# Patient Record
Sex: FEMALE | Race: BLACK OR AFRICAN AMERICAN | Employment: OTHER | URBAN - METROPOLITAN AREA
[De-identification: names, ages, dates, MRNs, and addresses within clinical notes are randomized per-mention and may not be internally consistent; named-entity substitution may affect disease eponyms.]

---

## 2018-06-28 PROBLEM — I10 ESSENTIAL HYPERTENSION: Status: ACTIVE | Noted: 2018-06-28

## 2018-06-28 PROBLEM — E66.9 OBESITY (BMI 30.0-34.9): Status: ACTIVE | Noted: 2018-06-28

## 2018-06-28 PROBLEM — G62.9 NEUROPATHY: Status: ACTIVE | Noted: 2018-06-28

## 2018-06-28 PROBLEM — M25.561 CHRONIC PAIN OF RIGHT KNEE: Status: ACTIVE | Noted: 2018-06-28

## 2018-06-28 PROBLEM — G89.29 CHRONIC PAIN OF RIGHT KNEE: Status: ACTIVE | Noted: 2018-06-28

## 2018-06-28 PROBLEM — E78.2 MIXED HYPERLIPIDEMIA: Status: ACTIVE | Noted: 2018-06-28

## 2018-06-28 PROBLEM — Z72.0 TOBACCO ABUSE: Status: ACTIVE | Noted: 2018-06-28

## 2018-06-28 PROBLEM — F33.0 MILD EPISODE OF RECURRENT MAJOR DEPRESSIVE DISORDER (HCC): Status: ACTIVE | Noted: 2018-06-28

## 2018-09-25 ENCOUNTER — HOSPITAL ENCOUNTER (OUTPATIENT)
Dept: LAB | Age: 68
Discharge: HOME | End: 2018-09-25

## 2018-09-25 PROCEDURE — 88305 TISSUE EXAM BY PATHOLOGIST: CPT

## 2019-02-28 PROBLEM — E66.01 SEVERE OBESITY (HCC): Status: ACTIVE | Noted: 2019-02-28

## 2019-03-06 ENCOUNTER — APPOINTMENT (OUTPATIENT)
Dept: PHYSICAL THERAPY | Age: 69
End: 2019-03-06

## 2019-03-27 ENCOUNTER — HOSPITAL ENCOUNTER (OUTPATIENT)
Dept: MAMMOGRAPHY | Age: 69
Discharge: HOME | End: 2019-03-27
Attending: NURSE PRACTITIONER
Payer: MEDICARE

## 2019-03-27 DIAGNOSIS — Z12.39 SCREENING BREAST EXAMINATION: ICD-10-CM

## 2019-03-27 DIAGNOSIS — Z78.0 POSTMENOPAUSAL: ICD-10-CM

## 2019-03-27 PROCEDURE — 77080 DXA BONE DENSITY AXIAL: CPT

## 2019-03-27 PROCEDURE — 77067 SCR MAMMO BI INCL CAD: CPT

## 2019-03-27 NOTE — PROGRESS NOTES
Is pt taking her calcium and Vitamin D? If so, continue this.  BMD reveals 10 year probability of major osteoporotic fracture:  3.8%  10 year probability of hip fracture:  0.7%

## 2019-04-02 NOTE — PROGRESS NOTES
To pt.  Mammogram ok. NO SPECIFIC MAMMOGRAPHIC EVIDENCE FOR MALIGNANCY.  RECOMMEND FOLLOW-UP  MAMMOGRAPHY IN ONE YEAR.

## 2019-09-10 ENCOUNTER — APPOINTMENT (OUTPATIENT)
Dept: PHYSICAL THERAPY | Age: 69
End: 2019-09-10

## 2019-09-17 ENCOUNTER — HOSPITAL ENCOUNTER (OUTPATIENT)
Dept: SURGERY | Age: 69
Discharge: HOME | End: 2019-09-17
Payer: MEDICARE

## 2019-09-17 ENCOUNTER — HOME HEALTH ADMISSION (OUTPATIENT)
Dept: HOME HEALTH SERVICES | Facility: HOME HEALTH | Age: 69
End: 2019-09-17
Payer: MEDICARE

## 2019-09-17 ENCOUNTER — HOSPITAL ENCOUNTER (OUTPATIENT)
Dept: PHYSICAL THERAPY | Age: 69
Discharge: HOME | End: 2019-09-17
Payer: MEDICARE

## 2019-09-17 VITALS
DIASTOLIC BLOOD PRESSURE: 77 MMHG | HEIGHT: 65 IN | SYSTOLIC BLOOD PRESSURE: 124 MMHG | HEART RATE: 76 BPM | OXYGEN SATURATION: 98 % | TEMPERATURE: 97.2 F | WEIGHT: 205 LBS | BODY MASS INDEX: 34.16 KG/M2

## 2019-09-17 DIAGNOSIS — R06.83 SNORING: Primary | ICD-10-CM

## 2019-09-17 LAB
ALBUMIN SERPL-MCNC: 4.1 G/DL (ref 3.2–4.6)
ANION GAP SERPL CALC-SCNC: 12 MMOL/L (ref 7–16)
APPEARANCE UR: ABNORMAL
APTT PPP: 24.5 SEC (ref 24.7–39.8)
BACTERIA SPEC CULT: NORMAL
BACTERIA URNS QL MICRO: 0 /HPF
BASOPHILS # BLD: 0.1 K/UL (ref 0–0.2)
BASOPHILS NFR BLD: 1 % (ref 0–2)
BILIRUB UR QL: NEGATIVE
BUN SERPL-MCNC: 15 MG/DL (ref 8–23)
CALCIUM SERPL-MCNC: 9.4 MG/DL (ref 8.3–10.4)
CASTS URNS QL MICRO: ABNORMAL /LPF
CHLORIDE SERPL-SCNC: 103 MMOL/L (ref 98–107)
CO2 SERPL-SCNC: 22 MMOL/L (ref 21–32)
COLOR UR: YELLOW
CREAT SERPL-MCNC: 0.99 MG/DL (ref 0.6–1)
DIFFERENTIAL METHOD BLD: ABNORMAL
EOSINOPHIL # BLD: 0.2 K/UL (ref 0–0.8)
EOSINOPHIL NFR BLD: 2 % (ref 0.5–7.8)
EPI CELLS #/AREA URNS HPF: ABNORMAL /HPF
ERYTHROCYTE [DISTWIDTH] IN BLOOD BY AUTOMATED COUNT: 11.9 % (ref 11.9–14.6)
EST. AVERAGE GLUCOSE BLD GHB EST-MCNC: 131 MG/DL
GLUCOSE SERPL-MCNC: 227 MG/DL (ref 65–100)
GLUCOSE UR STRIP.AUTO-MCNC: NEGATIVE MG/DL
HBA1C MFR BLD: 6.2 %
HCT VFR BLD AUTO: 35.7 % (ref 35.8–46.3)
HGB BLD-MCNC: 11.7 G/DL (ref 11.7–15.4)
HGB UR QL STRIP: NEGATIVE
IMM GRANULOCYTES # BLD AUTO: 0 K/UL (ref 0–0.5)
IMM GRANULOCYTES NFR BLD AUTO: 1 % (ref 0–5)
INR PPP: 0.9
KETONES UR QL STRIP.AUTO: NEGATIVE MG/DL
LEUKOCYTE ESTERASE UR QL STRIP.AUTO: ABNORMAL
LYMPHOCYTES # BLD: 2.2 K/UL (ref 0.5–4.6)
LYMPHOCYTES NFR BLD: 27 % (ref 13–44)
MCH RBC QN AUTO: 32.6 PG (ref 26.1–32.9)
MCHC RBC AUTO-ENTMCNC: 32.8 G/DL (ref 31.4–35)
MCV RBC AUTO: 99.4 FL (ref 79.6–97.8)
MONOCYTES # BLD: 0.4 K/UL (ref 0.1–1.3)
MONOCYTES NFR BLD: 5 % (ref 4–12)
NEUTS SEG # BLD: 5.1 K/UL (ref 1.7–8.2)
NEUTS SEG NFR BLD: 64 % (ref 43–78)
NITRITE UR QL STRIP.AUTO: NEGATIVE
NRBC # BLD: 0 K/UL (ref 0–0.2)
PH UR STRIP: 5.5 [PH] (ref 5–9)
PLATELET # BLD AUTO: 317 K/UL (ref 150–450)
PMV BLD AUTO: 11.7 FL (ref 9.4–12.3)
POTASSIUM SERPL-SCNC: 3.6 MMOL/L (ref 3.5–5.1)
PROT UR STRIP-MCNC: ABNORMAL MG/DL
PROTHROMBIN TIME: 12.5 SEC (ref 11.7–14.5)
RBC # BLD AUTO: 3.59 M/UL (ref 4.05–5.2)
RBC #/AREA URNS HPF: ABNORMAL /HPF
SERVICE CMNT-IMP: NORMAL
SODIUM SERPL-SCNC: 137 MMOL/L (ref 136–145)
SP GR UR REFRACTOMETRY: 1.02 (ref 1–1.02)
UROBILINOGEN UR QL STRIP.AUTO: 1 EU/DL (ref 0.2–1)
WBC # BLD AUTO: 8 K/UL (ref 4.3–11.1)
WBC URNS QL MICRO: ABNORMAL /HPF

## 2019-09-17 PROCEDURE — 77030027138 HC INCENT SPIROMETER -A

## 2019-09-17 PROCEDURE — 97161 PT EVAL LOW COMPLEX 20 MIN: CPT

## 2019-09-17 PROCEDURE — 83036 HEMOGLOBIN GLYCOSYLATED A1C: CPT

## 2019-09-17 PROCEDURE — 85730 THROMBOPLASTIN TIME PARTIAL: CPT

## 2019-09-17 PROCEDURE — 85610 PROTHROMBIN TIME: CPT

## 2019-09-17 PROCEDURE — 82040 ASSAY OF SERUM ALBUMIN: CPT

## 2019-09-17 PROCEDURE — 93005 ELECTROCARDIOGRAM TRACING: CPT | Performed by: NURSE PRACTITIONER

## 2019-09-17 PROCEDURE — 81001 URINALYSIS AUTO W/SCOPE: CPT

## 2019-09-17 PROCEDURE — 85025 COMPLETE CBC W/AUTO DIFF WBC: CPT

## 2019-09-17 PROCEDURE — 36415 COLL VENOUS BLD VENIPUNCTURE: CPT

## 2019-09-17 PROCEDURE — 87641 MR-STAPH DNA AMP PROBE: CPT

## 2019-09-17 PROCEDURE — 80048 BASIC METABOLIC PNL TOTAL CA: CPT

## 2019-09-17 PROCEDURE — 80307 DRUG TEST PRSMV CHEM ANLYZR: CPT

## 2019-09-17 RX ORDER — MELATONIN
1000 DAILY
COMMUNITY
End: 2020-08-25 | Stop reason: SDUPTHER

## 2019-09-17 RX ORDER — LANOLIN ALCOHOL/MO/W.PET/CERES
1000 CREAM (GRAM) TOPICAL DAILY
COMMUNITY
End: 2020-08-25 | Stop reason: SDUPTHER

## 2019-09-17 NOTE — PERIOP NOTES
Reed Cox MD    Your patient recently had labs drawn during a hospital appointment due to an upcoming surgery. The results are attached. If you have any questions or concerns please reach out to your patient for a follow-up in your office. Please do not respond to this message as my mailbox is not monitored. You may call [phone] with questions or concerns. Patient wanted you to be aware that her BP today was 124/77. Thanks! Recent Results (from the past 12 hour(s))   CBC WITH AUTOMATED DIFF    Collection Time: 09/17/19 11:55 AM   Result Value Ref Range    WBC 8.0 4.3 - 11.1 K/uL    RBC 3.59 (L) 4.05 - 5.2 M/uL    HGB 11.7 11.7 - 15.4 g/dL    HCT 35.7 (L) 35.8 - 46.3 %    MCV 99.4 (H) 79.6 - 97.8 FL    MCH 32.6 26.1 - 32.9 PG    MCHC 32.8 31.4 - 35.0 g/dL    RDW 11.9 11.9 - 14.6 %    PLATELET 815 959 - 349 K/uL    MPV 11.7 9.4 - 12.3 FL    ABSOLUTE NRBC 0.00 0.0 - 0.2 K/uL    DF AUTOMATED      NEUTROPHILS 64 43 - 78 %    LYMPHOCYTES 27 13 - 44 %    MONOCYTES 5 4.0 - 12.0 %    EOSINOPHILS 2 0.5 - 7.8 %    BASOPHILS 1 0.0 - 2.0 %    IMMATURE GRANULOCYTES 1 0.0 - 5.0 %    ABS. NEUTROPHILS 5.1 1.7 - 8.2 K/UL    ABS. LYMPHOCYTES 2.2 0.5 - 4.6 K/UL    ABS. MONOCYTES 0.4 0.1 - 1.3 K/UL    ABS. EOSINOPHILS 0.2 0.0 - 0.8 K/UL    ABS. BASOPHILS 0.1 0.0 - 0.2 K/UL    ABS. IMM.  GRANS. 0.0 0.0 - 0.5 K/UL   METABOLIC PANEL, BASIC    Collection Time: 09/17/19 11:55 AM   Result Value Ref Range    Sodium 137 136 - 145 mmol/L    Potassium 3.6 3.5 - 5.1 mmol/L    Chloride 103 98 - 107 mmol/L    CO2 22 21 - 32 mmol/L    Anion gap 12 7 - 16 mmol/L    Glucose 227 (H) 65 - 100 mg/dL    BUN 15 8 - 23 MG/DL    Creatinine 0.99 0.6 - 1.0 MG/DL    GFR est AA >60 >60 ml/min/1.73m2    GFR est non-AA 60 (L) >60 ml/min/1.73m2    Calcium 9.4 8.3 - 10.4 MG/DL   PROTHROMBIN TIME + INR    Collection Time: 09/17/19 11:55 AM   Result Value Ref Range    Prothrombin time 12.5 11.7 - 14.5 sec    INR 0.9     PTT    Collection Time: 09/17/19 11:55 AM   Result Value Ref Range    aPTT 24.5 (L) 24.7 - 39.8 SEC   HEMOGLOBIN A1C WITH EAG    Collection Time: 09/17/19 11:55 AM   Result Value Ref Range    Hemoglobin A1c 6.2 %    Est. average glucose 131 mg/dL   ALBUMIN    Collection Time: 09/17/19 11:55 AM   Result Value Ref Range    Albumin 4.1 3.2 - 4.6 g/dL   URINALYSIS W/ RFLX MICROSCOPIC    Collection Time: 09/17/19 11:55 AM   Result Value Ref Range    Color YELLOW      Appearance CLOUDY      Specific gravity 1.020 1.001 - 1.023      pH (UA) 5.5 5.0 - 9.0      Protein TRACE (A) NEG mg/dL    Glucose NEGATIVE  mg/dL    Ketone NEGATIVE  NEG mg/dL    Bilirubin NEGATIVE  NEG      Blood NEGATIVE  NEG      Urobilinogen 1.0 0.2 - 1.0 EU/dL    Nitrites NEGATIVE  NEG      Leukocyte Esterase LARGE (A) NEG      WBC 10-20 0 /hpf    RBC 10-20 0 /hpf    Epithelial cells 10-20 0 /hpf    Bacteria 0 0 /hpf    Casts 5-10 0 /lpf   EKG, 12 LEAD, INITIAL    Collection Time: 09/17/19  1:10 PM   Result Value Ref Range    Ventricular Rate 68 BPM    Atrial Rate 68 BPM    P-R Interval 120 ms    QRS Duration 84 ms    Q-T Interval 386 ms    QTC Calculation (Bezet) 410 ms    Calculated P Axis 63 degrees    Calculated R Axis -3 degrees    Calculated T Axis 41 degrees    Diagnosis       Normal sinus rhythm  Normal ECG  No previous ECGs available

## 2019-09-17 NOTE — PROGRESS NOTES
09/17/19 1200   Oxygen Therapy   O2 Sat (%) 97 %   Pulse via Oximetry 90 beats per minute   O2 Device Room air   Pre-Treatment   Breath Sounds Bilateral Clear;Diminished   Pre FEV1 (liters) 1.5 liters   % Predicted 72   Incentive Spirometry Treatment   Actual Volume (ml) 1500 ml   Sleep Disorder Breathing Screen:     Patient reports symptoms of:   · Snoring   · Excessive daytime sleepiness with napping  · HTN  · TIRED  · STOP-BANG __4__  · Monroe Score _12___  · Height_5'5\"____ Weight__205 lbs___  · FREIDMAN 4  · SACS SCORE 9     Sleepiness Scale:     Sitting and reading 3    Watching TV 3    Sitting inactive in a public place 0    As a passenger in a car for an hour without a break 0    Lying down to rest in the afternoon when circumstances Permits 3    Sitting and talking to someone 0    Sitting quietly after lunch without alcohol 3    In a car, while stopped for a few minutes 0    Total :  12    Refer patient for sleep study based on above assessment. Initial respiratory Assessment completed with pt. Pt was interviewed and evaluated in [facility] prior to surgery. Patient ID:  Heraclio Egan  [id number]  68 y.o.  1950  Surgeon: Dr. Anton Barnes  Date of Surgery: The linked surgery was not found. Please check manually. Procedure: Total Right Hip Arthroplasty  Primary Care Physician: Tatiana Mcintosh [phone]  Specialists:                                  Pt instructed in the use of Incentive Spirometry. Pt instructed to bring Incentive Spirometer back on date of surgery & to start using Is upon return to pt room.     Pt taught proper cough technique    History of smoking:   .25 PPD FOR 20 YEARS                                                      Quit date:       July 2019    Secondhand smoke:DENIES      Past procedures with Oxygen desaturation:DELAYED AWAKENING    Past Medical History:   Diagnosis Date    Adverse effect of anesthesia     slow to wake up     Arthritis     BMI 34.0-34.9,adult     Depression     on med for control     Former smoker     Hypercholesterolemia     on med for control     Hypertension     on med for control     Neuropathy                                                                                                                                                      Respiratory history:DENIES SOB                                                                 Respiratory meds:  DENIES                                       FAMILY PRESENT:            SISTER                                                                                  PAST SLEEP STUDY:        DENIES                     HX OF PRISCILA:                        DENIES                                     PRISCILA assessment:                                               SLEEPS ON SIDE     &        BACK                                                                 PHYSICAL EXAM   Body mass index is 34.11 kg/m².    Visit Vitals  /77 (BP 1 Location: Left arm, BP Patient Position: At rest;Sitting)   Pulse 76   Temp 97.2 °F (36.2 °C)   Ht 5' 5\" (1.651 m)   Wt 93 kg (205 lb)   SpO2 98%   BMI 34.11 kg/m²     Neck circumference: 37     cm    Loud snoring:        YES                              Witnessed apnea or wakening gasping or choking:,             DENIES,                                                                                             WAKES SELF UP    Awakens with headaches:                                                YES    Morning or daytime tiredness/ sleepiness:                  NAPS EVERY DAY FOR 2 HOURS                                                                                       TIRED   Dry mouth or sore throat in morning:                YES                                                                           Altman stage:  4    SACS score:9      Stop Bang 4                               CPAP:                       NONE CONT SAT HS            Referrals:  [facility]  Pt.  Phone Number:  [phone]

## 2019-09-17 NOTE — PROGRESS NOTES
JORGE met with pt in Prehab to discuss Right AIDEN scheduled for 10/2/19. Pt's dtr and gdtr live with her and her sister is close by and can all assist her postop. Pt plans to return home with HHPT. Pt resides in [facility] and is agreeable to [facility]. [facility] referral completed. Pt has a cane and grab bar beside toilet. Pt will try to borrow RW. JORGE will meet with pt postop and verify DME/ dc needs. No additional needs or questions identified at this time.    Thao Brooke

## 2019-09-17 NOTE — PROGRESS NOTES
Serene Patel  : (30 y.o.) [facility] at [address], [address] [place] [address]  [phone]       Physical Therapy Prehab Plan of Treatment and Evaluation Summary:2019    ICD-10: Treatment Diagnosis:   · Pain in Right Hip (M25.551)  · Stiffness of Right Hip, Not elsewhere classified (M25.651)  · Difficulty in walking, Not elsewhere classified (R26.2)  · Other abnormalities of gait and mobility (R26.89)  Precautions/Allergies:   Patient has no known allergies. MEDICAL/REFERRING DIAGNOSIS:  Unilateral primary osteoarthritis, right hip [M16.11]  REFERRING PHYSICIAN: Guy Barajas MD  DATE OF SURGERY: 10/2/19    Assessment:   Comments:  Pain in right hip joint with decreased independence with functional mobility. Pt plans to return home following hospital stay. Pt motivated and prepared for surgery. Pt's sister present and supportive. PROBLEM LIST (Impacting functional limitations):  Ms. Jewel Matthews presents with the following right lower extremity(s) problems:  1. Transfers  2. Gait  3. Strength  4. Range of Motion  5. Pain   INTERVENTIONS PLANNED:  1. Home Exercise Program  2. Educational Discussion      TREATMENT PLAN: Effective Dates: 2019 TO 2019. Frequency/Duration: Patient to continue to perform home exercise program at least twice per day up until her surgery. GOALS: (Goals have been discussed and agreed upon with patient.)  Discharge Goals: Time Frame: 1 Day  1. Patient will demonstrate independence with a home exercise program designed to increase strength, range of motion and pain control to minimize functional deficits and optimize patient for total joint replacement. Rehabilitation Potential For Stated Goals: Good  Regarding Megha Gardner's therapy, I certify that the treatment plan above will be carried out by a therapist or under their direction.   Thank you for this referral,  Lesley Munoz, PT               HISTORY: Present Symptoms:  Pain Intensity 1: 10  Pain Location 1: Hip  Pain Orientation 1: Right   History of Present Injury/Illness (Reason for Referral):  Medical/Referring Diagnosis: Unilateral primary osteoarthritis, right hip [M16.11]   Past Medical History/Comorbidities:   Ms. Kevni Degroot  has a past medical history of Adverse effect of anesthesia, Arthritis, BMI 34.0-34.9,adult, Depression, Former smoker, Hypercholesterolemia, and Hypertension. She also has no past medical history of Difficult intubation, Malignant hyperthermia due to anesthesia, Nausea & vomiting, or Pseudocholinesterase deficiency. Ms. Kevin Degroot  has a past surgical history that includes hx partial hysterectomy (1990's) and hx colonoscopy. Social History/Living Environment:   Home Environment: Private residence  # Steps to Enter: 0  One/Two Story Residence: One story  Living Alone: No  Support Systems: Other (comments), Child(gerry)(sister)  Patient Expects to be Discharged to[de-identified] Private residence  Current DME Used/Available at Home: Cane, straight  Tub or Shower Type: Tub/Shower combination  Work/Activity:  Retired at employment that requires light activity.   Dominant Side:  RIGHT  Current Medications:  See Pre-assessment nursing note   Number of Personal Factors/Comorbidities that affect the Plan of Care: 0: LOW COMPLEXITY   EXAMINATION:   ADLs (Current Functional Status):   Ambulation:  [x] Independent  [] Walk Indoors Only  [] Walk Outdoors  [x] Use Assistive Device quad cane  [] Use Wheelchair Only Dressing:  [x] Independent  Requires Assistance from Someone for:  [] Sock/Shoes  [] Pants  [] Everything   Bathing/Showering:   [x] Independent  [] Requires Assistance from Someone  [] Sponge Bath Only Household Activities:  [] Routine house and yard work  [x] Light Housework Only  [] None   Observation/Orthostatic Postural Assessment:   Within defined limits   ROM/Flexibility:   Gross Assessment: Yes  AROM: Generally decreased, functional Strength:   Gross Assessment: Yes  Strength: Generally decreased, functional                  Functional Mobility:    Gross Assessment: Yes  Coordination: Generally decreased, functional    Gait Description (WDL): Within defined limits  Stand to Sit: Modified independent  Sit to Stand: Modified independent  Distance (ft): 1000 Feet (ft)  Ambulation - Level of Assistance: Modified independent  Assistive Device: Cane, quad  Gait Abnormalities: Antalgic          Balance:    Sitting: Intact  Standing: Intact   Body Structures Involved:  1. Bones  2. Joints  3. Muscles  4. Ligaments Body Functions Affected:  1. Neuromusculoskeletal  2. Movement Related Activities and Participation Affected:  1. Mobility   Number of elements that affect the Plan of Care: 4+: HIGH COMPLEXITY   CLINICAL PRESENTATION:   Presentation: Stable and uncomplicated: LOW COMPLEXITY   CLINICAL DECISION MAKING:   Outcome Measure: Tool Used: Lower Extremity Functional Scale (LEFS)  Score:  Initial: 20/80 Most Recent: X/80 (Date: -- )   Interpretation of Score: 20 questions each scored on a 5 point scale with 0 representing \"extreme difficulty or unable to perform\" and 4 representing \"no difficulty\". The lower the score, the greater the functional disability. 80/80 represents no disability. Minimal detectable change is 9 points. Medical Necessity:   · Ms. Adonay Sams is expected to optimize her lower extremity strength and ROM in preparation for joint replacement surgery. Reason for Services/Other Comments:  · Achieve baseline assesment of musculoskeletal system, functional mobility and home environment. , educate in PT HEP in preparation for surgery, educate in hospital plan of care. Use of outcome tool(s) and clinical judgement create a POC that gives a: Clear prediction of patient's progress: LOW COMPLEXITY   TREATMENT:   Treatment/Session Assessment:  Patient was instructed in PT- HEP to increase strength and ROM in LEs.   Answered all questions. · Post session pain:  10  · Compliance with Program/Exercises: compliant most of the time.   Total Treatment Duration:  PT Patient Time In/Time Out  Time In: 1215  Time Out: [address], PT

## 2019-09-17 NOTE — PERIOP NOTES
Dr. Jarrell Brock,     Your patient was seen in [place] [id number] today. I am attaching the results of the labs for your to review and follow-up with if necessary. If you would like to order additional labs or tests please enter into [facility] or fax to [phone]. Please do not respond to this message as my mailbox is not monitored. You may call [phone] with questions or concerns. U/A was collected today because patient complained of burning with urination. Recent Results (from the past 12 hour(s))   CBC WITH AUTOMATED DIFF    Collection Time: 09/17/19 11:55 AM   Result Value Ref Range    WBC 8.0 4.3 - 11.1 K/uL    RBC 3.59 (L) 4.05 - 5.2 M/uL    HGB 11.7 11.7 - 15.4 g/dL    HCT 35.7 (L) 35.8 - 46.3 %    MCV 99.4 (H) 79.6 - 97.8 FL    MCH 32.6 26.1 - 32.9 PG    MCHC 32.8 31.4 - 35.0 g/dL    RDW 11.9 11.9 - 14.6 %    PLATELET 230 502 - 512 K/uL    MPV 11.7 9.4 - 12.3 FL    ABSOLUTE NRBC 0.00 0.0 - 0.2 K/uL    DF AUTOMATED      NEUTROPHILS 64 43 - 78 %    LYMPHOCYTES 27 13 - 44 %    MONOCYTES 5 4.0 - 12.0 %    EOSINOPHILS 2 0.5 - 7.8 %    BASOPHILS 1 0.0 - 2.0 %    IMMATURE GRANULOCYTES 1 0.0 - 5.0 %    ABS. NEUTROPHILS 5.1 1.7 - 8.2 K/UL    ABS. LYMPHOCYTES 2.2 0.5 - 4.6 K/UL    ABS. MONOCYTES 0.4 0.1 - 1.3 K/UL    ABS. EOSINOPHILS 0.2 0.0 - 0.8 K/UL    ABS. BASOPHILS 0.1 0.0 - 0.2 K/UL    ABS. IMM.  GRANS. 0.0 0.0 - 0.5 K/UL   METABOLIC PANEL, BASIC    Collection Time: 09/17/19 11:55 AM   Result Value Ref Range    Sodium 137 136 - 145 mmol/L    Potassium 3.6 3.5 - 5.1 mmol/L    Chloride 103 98 - 107 mmol/L    CO2 22 21 - 32 mmol/L    Anion gap 12 7 - 16 mmol/L    Glucose 227 (H) 65 - 100 mg/dL    BUN 15 8 - 23 MG/DL    Creatinine 0.99 0.6 - 1.0 MG/DL    GFR est AA >60 >60 ml/min/1.73m2    GFR est non-AA 60 (L) >60 ml/min/1.73m2    Calcium 9.4 8.3 - 10.4 MG/DL   PROTHROMBIN TIME + INR    Collection Time: 09/17/19 11:55 AM   Result Value Ref Range    Prothrombin time 12.5 11.7 - 14.5 sec    INR 0.9 PTT    Collection Time: 09/17/19 11:55 AM   Result Value Ref Range    aPTT 24.5 (L) 24.7 - 39.8 SEC   HEMOGLOBIN A1C WITH EAG    Collection Time: 09/17/19 11:55 AM   Result Value Ref Range    Hemoglobin A1c 6.2 %    Est. average glucose 131 mg/dL   ALBUMIN    Collection Time: 09/17/19 11:55 AM   Result Value Ref Range    Albumin 4.1 3.2 - 4.6 g/dL   URINALYSIS W/ RFLX MICROSCOPIC    Collection Time: 09/17/19 11:55 AM   Result Value Ref Range    Color YELLOW      Appearance CLOUDY      Specific gravity 1.020 1.001 - 1.023      pH (UA) 5.5 5.0 - 9.0      Protein TRACE (A) NEG mg/dL    Glucose NEGATIVE  mg/dL    Ketone NEGATIVE  NEG mg/dL    Bilirubin NEGATIVE  NEG      Blood NEGATIVE  NEG      Urobilinogen 1.0 0.2 - 1.0 EU/dL    Nitrites NEGATIVE  NEG      Leukocyte Esterase LARGE (A) NEG      WBC 10-20 0 /hpf    RBC 10-20 0 /hpf    Epithelial cells 10-20 0 /hpf    Bacteria 0 0 /hpf    Casts 5-10 0 /lpf   EKG, 12 LEAD, INITIAL    Collection Time: 09/17/19  1:10 PM   Result Value Ref Range    Ventricular Rate 68 BPM    Atrial Rate 68 BPM    P-R Interval 120 ms    QRS Duration 84 ms    Q-T Interval 386 ms    QTC Calculation (Bezet) 410 ms    Calculated P Axis 63 degrees    Calculated R Axis -3 degrees    Calculated T Axis 41 degrees    Diagnosis       Normal sinus rhythm  Normal ECG  No previous ECGs available

## 2019-09-17 NOTE — PERIOP NOTES
Patient verified name and . Order for consent found in EHR and matches case posting; patient verified. Type 3 surgery, walk in assessment complete. Labs per surgeon: CBC, BMP, PT/PTT, Albumin, HGB-A1C ; results within anesthesia protocols except Glucose of 227; will have anesthesia review. Urine Nicotine result pending. Will flag chart for charge nurse to follow up on result.  [address], NP to notify her that patient is having burning with urination. Received order for U/A. U/A order released in EHR; result pending. Labs per anesthesia protocol: included in surgeons orders. EKG: done today; within anesthesia protocols. MRSA/MSSA swab collected; pharmacy to review and dose antibiotic as appropriate. Hospital approved surgical skin cleanser and instructions to return bottle on DOS given per hospital policy. Patient provided with handouts including Guide to Surgery, Pain Management, Hand Hygiene, Blood Transfusion Education, and [facility] Brochure. Patient answered medical/surgical history questions at their best of ability. All prior to admission medications documented in [facility]. Original medication prescription bottles visualized during patient appointment. Patient instructed to hold all vitamins 7 days prior to surgery and NSAIDS 5 days prior to surgery. Prescription medications to hold: ibuprofen. Patient instructed to continue previous medications as prescribed prior to surgery and to take the following medications the day of surgery according to anesthesia guidelines with a small sip of water: cyclobenzaprine, citalopram, gabapentin, amlodipine. Patient teach back successful and patient demonstrates knowledge of instruction.

## 2019-09-17 NOTE — ADVANCED PRACTICE NURSE
Total Joint Surgery Preoperative Chart Review      Patient ID:  Blaise Jerry  [id number]  43 y.o.  1950  Surgeon: Dr. Dawson Tee  Date of Surgery: The linked surgery was not found. Please check manually. Procedure: Total Right Hip Arthroplasty  Primary Care Physician: Ruddy Faust [phone]  Specialty Physician(s):      Subjective:   Blaise Jerry is a 79 y.o. BLACK OR  female who presents for preoperative evaluation for Total Right Hip arthroplasty. This is a preoperative chart review note based on data collected by the nurse at the surgical Pre-Assessment visit. Past Medical History:   Diagnosis Date    Adverse effect of anesthesia     slow to wake up     Arthritis     BMI 34.0-34.9,adult     Depression     on med for control     Former smoker     Hypercholesterolemia     on med for control     Hypertension     on med for control     Neuropathy       Past Surgical History:   Procedure Laterality Date    HX COLONOSCOPY      x2    HX PARTIAL HYSTERECTOMY  1's     Family History   Problem Relation Age of Onset    Cancer Mother     Breast Cancer Mother 79    Heart Attack Father     Cancer Sister       Social History     Tobacco Use    Smoking status: Former Smoker     Packs/day: 0.50     Years: 20.00     Pack years: 10.00     Last attempt to quit: 2019     Years since quittin.2    Smokeless tobacco: Never Used   Substance Use Topics    Alcohol use: Yes     Comment: occasionally        Prior to Admission medications    Medication Sig Start Date End Date Taking? Authorizing Provider   naproxen sodium (ALEVE PO) Take 1-2 Tabs by mouth daily as needed for Pain. Yes Provider, Historical   multivitamin with minerals (HAIR,SKIN AND NAILS PO) Take 1 Tab by mouth daily. Yes Provider, Historical   cyanocobalamin 1,000 mcg tablet Take 1,000 mcg by mouth daily.    Yes Provider, Historical   cholecalciferol (VITAMIN D3) 1,000 unit tablet Take 1,000 Units by mouth daily. Yes Provider, Historical   ibuprofen (MOTRIN) 800 mg tablet Take 1 Tab by mouth every eight (8) hours as needed for Pain (use sparingly, only if needed). Take 1/2 to 1 tab PO 8 hours PRN 9/3/19  Yes Erika Martini MD   gabapentin (NEURONTIN) 300 mg capsule Take 1 Cap by mouth three (3) times daily. PRN pain, use sparingly 9/3/19  Yes Erika Martini MD   amLODIPine (NORVASC) 10 mg tablet Take 1 Tab by mouth daily. Take 1/2 tab PO once daily for one week, then increase to full tab once daily thereafter 9/3/19  Yes Erika Martini MD   pravastatin (PRAVACHOL) 40 mg tablet Take 1 Tab by mouth nightly. 7/11/19  Yes Erika Martini MD   citalopram (CELEXA) 20 mg tablet Take 1 Tab by mouth daily. Take 1/2 tab PO once daily for 2 weeks, then increase to a whole tab once daily 7/11/19  Yes Erika Martini MD   cyclobenzaprine (FLEXERIL) 10 mg tablet Take 1 Tab by mouth two (2) times a day. 2/28/19  Yes Joel Galindo MD   butalbital-acetaminophen-caffeine (FIORICET, ESGIC) -40 mg per tablet Take 1 Tab by mouth daily as needed for Pain.  11/29/18  Yes Joel Galindo MD     No Known Allergies       Objective:     Physical Exam:   Patient Vitals for the past 24 hrs:   Temp Pulse BP SpO2   09/17/19 1328 97.2 °F (36.2 °C) 76 124/77 98 %       ECG:    EKG Results     Procedure 720 Value Units Date/Time    EKG, 12 LEAD, INITIAL [[id number]] Collected:  09/17/19 1310    Order Status:  Completed Updated:  09/17/19 1448     Ventricular Rate 68 BPM      Atrial Rate 68 BPM      P-R Interval 120 ms      QRS Duration 84 ms      Q-T Interval 386 ms      QTC Calculation (Bezet) 410 ms      Calculated P Axis 63 degrees      Calculated R Axis -3 degrees      Calculated T Axis 41 degrees      Diagnosis --     Normal sinus rhythm  Normal ECG  No previous ECGs available            Data Review:   Labs:   Recent Results (from the past 24 hour(s))   CBC WITH AUTOMATED DIFF Collection Time: 09/17/19 11:55 AM   Result Value Ref Range    WBC 8.0 4.3 - 11.1 K/uL    RBC 3.59 (L) 4.05 - 5.2 M/uL    HGB 11.7 11.7 - 15.4 g/dL    HCT 35.7 (L) 35.8 - 46.3 %    MCV 99.4 (H) 79.6 - 97.8 FL    MCH 32.6 26.1 - 32.9 PG    MCHC 32.8 31.4 - 35.0 g/dL    RDW 11.9 11.9 - 14.6 %    PLATELET 964 995 - 394 K/uL    MPV 11.7 9.4 - 12.3 FL    ABSOLUTE NRBC 0.00 0.0 - 0.2 K/uL    DF AUTOMATED      NEUTROPHILS 64 43 - 78 %    LYMPHOCYTES 27 13 - 44 %    MONOCYTES 5 4.0 - 12.0 %    EOSINOPHILS 2 0.5 - 7.8 %    BASOPHILS 1 0.0 - 2.0 %    IMMATURE GRANULOCYTES 1 0.0 - 5.0 %    ABS. NEUTROPHILS 5.1 1.7 - 8.2 K/UL    ABS. LYMPHOCYTES 2.2 0.5 - 4.6 K/UL    ABS. MONOCYTES 0.4 0.1 - 1.3 K/UL    ABS. EOSINOPHILS 0.2 0.0 - 0.8 K/UL    ABS. BASOPHILS 0.1 0.0 - 0.2 K/UL    ABS. IMM.  GRANS. 0.0 0.0 - 0.5 K/UL   METABOLIC PANEL, BASIC    Collection Time: 09/17/19 11:55 AM   Result Value Ref Range    Sodium 137 136 - 145 mmol/L    Potassium 3.6 3.5 - 5.1 mmol/L    Chloride 103 98 - 107 mmol/L    CO2 22 21 - 32 mmol/L    Anion gap 12 7 - 16 mmol/L    Glucose 227 (H) 65 - 100 mg/dL    BUN 15 8 - 23 MG/DL    Creatinine 0.99 0.6 - 1.0 MG/DL    GFR est AA >60 >60 ml/min/1.73m2    GFR est non-AA 60 (L) >60 ml/min/1.73m2    Calcium 9.4 8.3 - 10.4 MG/DL   PROTHROMBIN TIME + INR    Collection Time: 09/17/19 11:55 AM   Result Value Ref Range    Prothrombin time 12.5 11.7 - 14.5 sec    INR 0.9     PTT    Collection Time: 09/17/19 11:55 AM   Result Value Ref Range    aPTT 24.5 (L) 24.7 - 39.8 SEC   HEMOGLOBIN A1C WITH EAG    Collection Time: 09/17/19 11:55 AM   Result Value Ref Range    Hemoglobin A1c 6.2 %    Est. average glucose 131 mg/dL   ALBUMIN    Collection Time: 09/17/19 11:55 AM   Result Value Ref Range    Albumin 4.1 3.2 - 4.6 g/dL   URINALYSIS W/ RFLX MICROSCOPIC    Collection Time: 09/17/19 11:55 AM   Result Value Ref Range    Color YELLOW      Appearance CLOUDY      Specific gravity 1.020 1.001 - 1.023      pH (UA) 5.5 5.0 - 9.0      Protein TRACE (A) NEG mg/dL    Glucose NEGATIVE  mg/dL    Ketone NEGATIVE  NEG mg/dL    Bilirubin NEGATIVE  NEG      Blood NEGATIVE  NEG      Urobilinogen 1.0 0.2 - 1.0 EU/dL    Nitrites NEGATIVE  NEG      Leukocyte Esterase LARGE (A) NEG      WBC 10-20 0 /hpf    RBC 10-20 0 /hpf    Epithelial cells 10-20 0 /hpf    Bacteria 0 0 /hpf    Casts 5-10 0 /lpf   EKG, 12 LEAD, INITIAL    Collection Time: 09/17/19  1:10 PM   Result Value Ref Range    Ventricular Rate 68 BPM    Atrial Rate 68 BPM    P-R Interval 120 ms    QRS Duration 84 ms    Q-T Interval 386 ms    QTC Calculation (Bezet) 410 ms    Calculated P Axis 63 degrees    Calculated R Axis -3 degrees    Calculated T Axis 41 degrees    Diagnosis       Normal sinus rhythm  Normal ECG  No previous ECGs available           Problem List:  )  Patient Active Problem List   Diagnosis Code    Essential hypertension I10    Mixed hyperlipidemia E78.2    Chronic pain of right knee M25.561, G89.29    Mild episode of recurrent major depressive disorder (HCC) F33.0    Neuropathy G62.9    Tobacco abuse Z72.0    Severe obesity (HCC) E66.01    Snoring R06.83       Total Joint Surgery Pre-Assessment Recommendations:        Patient reports the symptoms of snoring, observed apnea and /or excessive daytime sleepiness. Will refer patient for HST based on above assessment. Recommend continuous saturation monitoring hours of sleep, during hospitalization.              Signed By: ABBIE Sanchez    September 17, 2019

## 2019-09-18 LAB
ATRIAL RATE: 68 BPM
CALCULATED P AXIS, ECG09: 63 DEGREES
CALCULATED R AXIS, ECG10: -3 DEGREES
CALCULATED T AXIS, ECG11: 41 DEGREES
COTININE UR QL SCN: NEGATIVE NG/ML
DIAGNOSIS, 93000: NORMAL
DRUG SCREEN COMMENT:, 753798: NORMAL
P-R INTERVAL, ECG05: 120 MS
Q-T INTERVAL, ECG07: 386 MS
QRS DURATION, ECG06: 84 MS
QTC CALCULATION (BEZET), ECG08: 410 MS
VENTRICULAR RATE, ECG03: 68 BPM

## 2019-09-19 NOTE — PERIOP NOTES
Sara Chrissie,  [[id number]] (Order [id number])   Lab   Date: 9/17/2019 Department: [facility] Pre Assessment Released By: Erick Larry RN (auto-released) Authorizing: Isaiah Phelps MD       Component Value Flag Ref Range Units Status   Cotinine Screen, urine NEGATIVE   Cjehtx=166 ng/mL Final   Drug Screen Comment: Comment

## 2019-10-01 ENCOUNTER — ANESTHESIA EVENT (OUTPATIENT)
Dept: SURGERY | Age: 69
DRG: 470 | End: 2019-10-01
Payer: MEDICARE

## 2019-10-01 NOTE — ANESTHESIA PREPROCEDURE EVALUATION
Relevant Problems   No relevant active problems       Anesthetic History   No history of anesthetic complications            Review of Systems / Medical History  Patient summary reviewed and pertinent labs reviewed    Pulmonary          Undiagnosed apnea and smoker (former (quit 7/19))         Neuro/Psych         Psychiatric history     Cardiovascular    Hypertension: well controlled              Exercise tolerance: >4 METS     GI/Hepatic/Renal  Within defined limits              Endo/Other        Obesity and arthritis     Other Findings            Physical Exam    Airway  Mallampati: II  TM Distance: 4 - 6 cm  Neck ROM: normal range of motion   Mouth opening: Normal     Cardiovascular    Rhythm: regular  Rate: normal         Dental    Dentition: Full upper dentures     Pulmonary  Breath sounds clear to auscultation               Abdominal         Other Findings            Anesthetic Plan    ASA: 2  Anesthesia type: spinal          Induction: Intravenous  Anesthetic plan and risks discussed with: Patient and Family

## 2019-10-02 ENCOUNTER — HOSPITAL ENCOUNTER (INPATIENT)
Age: 69
LOS: 1 days | Discharge: HOME HEALTH | DRG: 470 | End: 2019-10-03
Attending: ORTHOPAEDIC SURGERY | Admitting: ORTHOPAEDIC SURGERY
Payer: MEDICARE

## 2019-10-02 ENCOUNTER — APPOINTMENT (OUTPATIENT)
Dept: GENERAL RADIOLOGY | Age: 69
DRG: 470 | End: 2019-10-02
Attending: ORTHOPAEDIC SURGERY
Payer: MEDICARE

## 2019-10-02 ENCOUNTER — ANESTHESIA (OUTPATIENT)
Dept: SURGERY | Age: 69
DRG: 470 | End: 2019-10-02
Payer: MEDICARE

## 2019-10-02 DIAGNOSIS — M16.11 PRIMARY OSTEOARTHRITIS OF RIGHT HIP: Primary | ICD-10-CM

## 2019-10-02 LAB
GLUCOSE BLD STRIP.AUTO-MCNC: 139 MG/DL (ref 65–100)
HGB BLD-MCNC: 10.3 G/DL (ref 11.7–15.4)

## 2019-10-02 PROCEDURE — 74011250636 HC RX REV CODE- 250/636: Performed by: NURSE PRACTITIONER

## 2019-10-02 PROCEDURE — 77030034479 HC ADH SKN CLSR PRINEO J&J -B: Performed by: ORTHOPAEDIC SURGERY

## 2019-10-02 PROCEDURE — 74011250636 HC RX REV CODE- 250/636: Performed by: ORTHOPAEDIC SURGERY

## 2019-10-02 PROCEDURE — 74011000250 HC RX REV CODE- 250

## 2019-10-02 PROCEDURE — 72170 X-RAY EXAM OF PELVIS: CPT

## 2019-10-02 PROCEDURE — 74011000250 HC RX REV CODE- 250: Performed by: ANESTHESIOLOGY

## 2019-10-02 PROCEDURE — 0SR904A REPLACEMENT OF RIGHT HIP JOINT WITH CERAMIC ON POLYETHYLENE SYNTHETIC SUBSTITUTE, UNCEMENTED, OPEN APPROACH: ICD-10-PCS | Performed by: ORTHOPAEDIC SURGERY

## 2019-10-02 PROCEDURE — 77030018074 HC RTVR SUT ARTH4 S&N -B: Performed by: ORTHOPAEDIC SURGERY

## 2019-10-02 PROCEDURE — 77030006835 HC BLD SAW SAG STRY -B: Performed by: ORTHOPAEDIC SURGERY

## 2019-10-02 PROCEDURE — 77030018836 HC SOL IRR NACL ICUM -A: Performed by: ORTHOPAEDIC SURGERY

## 2019-10-02 PROCEDURE — 97161 PT EVAL LOW COMPLEX 20 MIN: CPT

## 2019-10-02 PROCEDURE — 94762 N-INVAS EAR/PLS OXIMTRY CONT: CPT

## 2019-10-02 PROCEDURE — 74011250636 HC RX REV CODE- 250/636: Performed by: ANESTHESIOLOGY

## 2019-10-02 PROCEDURE — 77030020263 HC SOL INJ SOD CL0.9% LFCR 1000ML

## 2019-10-02 PROCEDURE — 94760 N-INVAS EAR/PLS OXIMETRY 1: CPT

## 2019-10-02 PROCEDURE — 97110 THERAPEUTIC EXERCISES: CPT

## 2019-10-02 PROCEDURE — 74011250637 HC RX REV CODE- 250/637: Performed by: ORTHOPAEDIC SURGERY

## 2019-10-02 PROCEDURE — 77030033067 HC SUT PDO STRATFX SPIR J&J -B: Performed by: ORTHOPAEDIC SURGERY

## 2019-10-02 PROCEDURE — 77030019557 HC ELECTRD VES SEAL MEDT -F: Performed by: ORTHOPAEDIC SURGERY

## 2019-10-02 PROCEDURE — 77030007880 HC KT SPN EPDRL BBMI -B: Performed by: ANESTHESIOLOGY

## 2019-10-02 PROCEDURE — 77030013708 HC HNDPC SUC IRR PULS STRY –B: Performed by: ORTHOPAEDIC SURGERY

## 2019-10-02 PROCEDURE — 97165 OT EVAL LOW COMPLEX 30 MIN: CPT

## 2019-10-02 PROCEDURE — 82962 GLUCOSE BLOOD TEST: CPT

## 2019-10-02 PROCEDURE — 76010000171 HC OR TIME 2 TO 2.5 HR INTENSV-TIER 1: Performed by: ORTHOPAEDIC SURGERY

## 2019-10-02 PROCEDURE — 77030003665 HC NDL SPN BBMI -A: Performed by: ANESTHESIOLOGY

## 2019-10-02 PROCEDURE — C1776 JOINT DEVICE (IMPLANTABLE): HCPCS | Performed by: ORTHOPAEDIC SURGERY

## 2019-10-02 PROCEDURE — 74011000250 HC RX REV CODE- 250: Performed by: ORTHOPAEDIC SURGERY

## 2019-10-02 PROCEDURE — 77030002922 HC SUT FBRWRE ARTH -B: Performed by: ORTHOPAEDIC SURGERY

## 2019-10-02 PROCEDURE — 36415 COLL VENOUS BLD VENIPUNCTURE: CPT

## 2019-10-02 PROCEDURE — 76060000035 HC ANESTHESIA 2 TO 2.5 HR: Performed by: ORTHOPAEDIC SURGERY

## 2019-10-02 PROCEDURE — 65270000029 HC RM PRIVATE

## 2019-10-02 PROCEDURE — 77030002933 HC SUT MCRYL J&J -A: Performed by: ORTHOPAEDIC SURGERY

## 2019-10-02 PROCEDURE — 74011250636 HC RX REV CODE- 250/636

## 2019-10-02 PROCEDURE — 77030033138 HC SUT PGA STRATFX J&J -B: Performed by: ORTHOPAEDIC SURGERY

## 2019-10-02 PROCEDURE — 77030019940 HC BLNKT HYPOTHRM STRY -B: Performed by: ANESTHESIOLOGY

## 2019-10-02 PROCEDURE — 74011250637 HC RX REV CODE- 250/637: Performed by: NURSE PRACTITIONER

## 2019-10-02 PROCEDURE — 76210000006 HC OR PH I REC 0.5 TO 1 HR: Performed by: ORTHOPAEDIC SURGERY

## 2019-10-02 PROCEDURE — 85018 HEMOGLOBIN: CPT

## 2019-10-02 DEVICE — SHELL ACET CLUS H 50D TRTANIUM -- TRIDENT II: Type: IMPLANTABLE DEVICE | Site: HIP | Status: FUNCTIONAL

## 2019-10-02 DEVICE — SCREW BNE L35MM DIA6.5MM CANC HIP TRITANIUM ST CANN: Type: IMPLANTABLE DEVICE | Site: HIP | Status: FUNCTIONAL

## 2019-10-02 DEVICE — STEM FEM SZ 4 127D 35X105MM -- ACCOLADE II V40: Type: IMPLANTABLE DEVICE | Site: HIP | Status: FUNCTIONAL

## 2019-10-02 DEVICE — HEAD FEM DELT V40 -4MM NK 32MM -- V40 BIOLOX: Type: IMPLANTABLE DEVICE | Site: HIP | Status: FUNCTIONAL

## 2019-10-02 DEVICE — LINER ACET SZ D OD50X52MM ID32MM THK5.9MM 0DEG HIP X3: Type: IMPLANTABLE DEVICE | Site: HIP | Status: FUNCTIONAL

## 2019-10-02 RX ORDER — DIPHENHYDRAMINE HYDROCHLORIDE 50 MG/ML
12.5 INJECTION, SOLUTION INTRAMUSCULAR; INTRAVENOUS
Status: DISCONTINUED | OUTPATIENT
Start: 2019-10-02 | End: 2019-10-02 | Stop reason: HOSPADM

## 2019-10-02 RX ORDER — ASPIRIN 81 MG/1
81 TABLET ORAL 2 TIMES DAILY
Qty: 60 TAB | Refills: 0 | Status: SHIPPED | OUTPATIENT
Start: 2019-10-02 | End: 2022-05-11

## 2019-10-02 RX ORDER — KETOROLAC TROMETHAMINE 15 MG/ML
15 INJECTION, SOLUTION INTRAMUSCULAR; INTRAVENOUS EVERY 8 HOURS
Status: COMPLETED | OUTPATIENT
Start: 2019-10-02 | End: 2019-10-03

## 2019-10-02 RX ORDER — GABAPENTIN 300 MG/1
300 CAPSULE ORAL 3 TIMES DAILY
Status: DISCONTINUED | OUTPATIENT
Start: 2019-10-02 | End: 2019-10-03 | Stop reason: HOSPADM

## 2019-10-02 RX ORDER — ROPIVACAINE HYDROCHLORIDE 2 MG/ML
INJECTION, SOLUTION EPIDURAL; INFILTRATION; PERINEURAL AS NEEDED
Status: DISCONTINUED | OUTPATIENT
Start: 2019-10-02 | End: 2019-10-02 | Stop reason: HOSPADM

## 2019-10-02 RX ORDER — FENTANYL CITRATE 50 UG/ML
100 INJECTION, SOLUTION INTRAMUSCULAR; INTRAVENOUS ONCE
Status: DISCONTINUED | OUTPATIENT
Start: 2019-10-02 | End: 2019-10-02 | Stop reason: HOSPADM

## 2019-10-02 RX ORDER — DIPHENHYDRAMINE HCL 25 MG
25 CAPSULE ORAL
Status: DISCONTINUED | OUTPATIENT
Start: 2019-10-02 | End: 2019-10-03 | Stop reason: HOSPADM

## 2019-10-02 RX ORDER — AMLODIPINE BESYLATE 10 MG/1
10 TABLET ORAL DAILY
Status: DISCONTINUED | OUTPATIENT
Start: 2019-10-03 | End: 2019-10-03 | Stop reason: HOSPADM

## 2019-10-02 RX ORDER — NEOMYCIN AND POLYMYXIN B SULFATES 40; 200000 MG/ML; [USP'U]/ML
SOLUTION IRRIGATION AS NEEDED
Status: DISCONTINUED | OUTPATIENT
Start: 2019-10-02 | End: 2019-10-02 | Stop reason: HOSPADM

## 2019-10-02 RX ORDER — EPHEDRINE SULFATE 50 MG/ML
INJECTION, SOLUTION INTRAVENOUS AS NEEDED
Status: DISCONTINUED | OUTPATIENT
Start: 2019-10-02 | End: 2019-10-02 | Stop reason: HOSPADM

## 2019-10-02 RX ORDER — PROPOFOL 10 MG/ML
INJECTION, EMULSION INTRAVENOUS
Status: DISCONTINUED | OUTPATIENT
Start: 2019-10-02 | End: 2019-10-02 | Stop reason: HOSPADM

## 2019-10-02 RX ORDER — ONDANSETRON 8 MG/1
8 TABLET, ORALLY DISINTEGRATING ORAL
Qty: 30 TAB | Refills: 0 | Status: SHIPPED | OUTPATIENT
Start: 2019-10-02 | End: 2021-09-16 | Stop reason: ALTCHOICE

## 2019-10-02 RX ORDER — MELOXICAM 7.5 MG/1
7.5 TABLET ORAL 2 TIMES DAILY
Status: DISCONTINUED | OUTPATIENT
Start: 2019-10-03 | End: 2019-10-03 | Stop reason: HOSPADM

## 2019-10-02 RX ORDER — HYDROMORPHONE HYDROCHLORIDE 2 MG/1
2 TABLET ORAL
Qty: 42 TAB | Refills: 0 | Status: SHIPPED | OUTPATIENT
Start: 2019-10-02 | End: 2019-10-09

## 2019-10-02 RX ORDER — MELOXICAM 7.5 MG/1
7.5 TABLET ORAL 2 TIMES DAILY
Qty: 60 TAB | Refills: 0 | Status: SHIPPED | OUTPATIENT
Start: 2019-10-02 | End: 2021-09-16 | Stop reason: SDUPTHER

## 2019-10-02 RX ORDER — BUPIVACAINE HYDROCHLORIDE 7.5 MG/ML
INJECTION, SOLUTION INTRASPINAL AS NEEDED
Status: DISCONTINUED | OUTPATIENT
Start: 2019-10-02 | End: 2019-10-02 | Stop reason: HOSPADM

## 2019-10-02 RX ORDER — DEXAMETHASONE SODIUM PHOSPHATE 100 MG/10ML
10 INJECTION INTRAMUSCULAR; INTRAVENOUS ONCE
Status: DISCONTINUED | OUTPATIENT
Start: 2019-10-03 | End: 2019-10-03 | Stop reason: HOSPADM

## 2019-10-02 RX ORDER — MELATONIN
1000 DAILY
Status: DISCONTINUED | OUTPATIENT
Start: 2019-10-03 | End: 2019-10-03 | Stop reason: HOSPADM

## 2019-10-02 RX ORDER — LANOLIN ALCOHOL/MO/W.PET/CERES
1000 CREAM (GRAM) TOPICAL DAILY
Status: DISCONTINUED | OUTPATIENT
Start: 2019-10-03 | End: 2019-10-03 | Stop reason: HOSPADM

## 2019-10-02 RX ORDER — FLUMAZENIL 0.1 MG/ML
0.2 INJECTION INTRAVENOUS
Status: DISCONTINUED | OUTPATIENT
Start: 2019-10-02 | End: 2019-10-02 | Stop reason: HOSPADM

## 2019-10-02 RX ORDER — ASPIRIN 81 MG/1
81 TABLET ORAL EVERY 12 HOURS
Status: DISCONTINUED | OUTPATIENT
Start: 2019-10-02 | End: 2019-10-03 | Stop reason: HOSPADM

## 2019-10-02 RX ORDER — SODIUM CHLORIDE 0.9 % (FLUSH) 0.9 %
5-40 SYRINGE (ML) INJECTION AS NEEDED
Status: DISCONTINUED | OUTPATIENT
Start: 2019-10-02 | End: 2019-10-03 | Stop reason: HOSPADM

## 2019-10-02 RX ORDER — SODIUM CHLORIDE, SODIUM LACTATE, POTASSIUM CHLORIDE, CALCIUM CHLORIDE 600; 310; 30; 20 MG/100ML; MG/100ML; MG/100ML; MG/100ML
100 INJECTION, SOLUTION INTRAVENOUS CONTINUOUS
Status: DISCONTINUED | OUTPATIENT
Start: 2019-10-02 | End: 2019-10-02 | Stop reason: HOSPADM

## 2019-10-02 RX ORDER — ZOLPIDEM TARTRATE 5 MG/1
5 TABLET ORAL
Status: DISCONTINUED | OUTPATIENT
Start: 2019-10-02 | End: 2019-10-03 | Stop reason: HOSPADM

## 2019-10-02 RX ORDER — LIDOCAINE HYDROCHLORIDE 10 MG/ML
0.1 INJECTION INFILTRATION; PERINEURAL AS NEEDED
Status: DISCONTINUED | OUTPATIENT
Start: 2019-10-02 | End: 2019-10-02 | Stop reason: HOSPADM

## 2019-10-02 RX ORDER — OXYCODONE HYDROCHLORIDE 5 MG/1
10 TABLET ORAL
Status: DISCONTINUED | OUTPATIENT
Start: 2019-10-02 | End: 2019-10-02 | Stop reason: HOSPADM

## 2019-10-02 RX ORDER — SODIUM CHLORIDE 0.9 % (FLUSH) 0.9 %
5-40 SYRINGE (ML) INJECTION EVERY 8 HOURS
Status: DISCONTINUED | OUTPATIENT
Start: 2019-10-02 | End: 2019-10-02 | Stop reason: HOSPADM

## 2019-10-02 RX ORDER — TRANEXAMIC ACID 650 1/1
1300 TABLET ORAL
Status: COMPLETED | OUTPATIENT
Start: 2019-10-02 | End: 2019-10-02

## 2019-10-02 RX ORDER — ACETAMINOPHEN 10 MG/ML
1000 INJECTION, SOLUTION INTRAVENOUS ONCE
Status: COMPLETED | OUTPATIENT
Start: 2019-10-02 | End: 2019-10-02

## 2019-10-02 RX ORDER — HYDROMORPHONE HYDROCHLORIDE 1 MG/ML
1 INJECTION, SOLUTION INTRAMUSCULAR; INTRAVENOUS; SUBCUTANEOUS
Status: DISCONTINUED | OUTPATIENT
Start: 2019-10-02 | End: 2019-10-03 | Stop reason: HOSPADM

## 2019-10-02 RX ORDER — FENTANYL CITRATE 50 UG/ML
INJECTION, SOLUTION INTRAMUSCULAR; INTRAVENOUS AS NEEDED
Status: DISCONTINUED | OUTPATIENT
Start: 2019-10-02 | End: 2019-10-02 | Stop reason: HOSPADM

## 2019-10-02 RX ORDER — ZOLPIDEM TARTRATE 5 MG/1
5 TABLET ORAL
Qty: 30 TAB | Refills: 0 | Status: SHIPPED | OUTPATIENT
Start: 2019-10-02 | End: 2021-01-05 | Stop reason: ALTCHOICE

## 2019-10-02 RX ORDER — MIDAZOLAM HYDROCHLORIDE 1 MG/ML
2 INJECTION, SOLUTION INTRAMUSCULAR; INTRAVENOUS
Status: DISCONTINUED | OUTPATIENT
Start: 2019-10-02 | End: 2019-10-02 | Stop reason: HOSPADM

## 2019-10-02 RX ORDER — CEFAZOLIN SODIUM/WATER 2 G/20 ML
2 SYRINGE (ML) INTRAVENOUS ONCE
Status: COMPLETED | OUTPATIENT
Start: 2019-10-02 | End: 2019-10-02

## 2019-10-02 RX ORDER — ONDANSETRON 4 MG/1
4 TABLET, ORALLY DISINTEGRATING ORAL
Status: DISCONTINUED | OUTPATIENT
Start: 2019-10-02 | End: 2019-10-03 | Stop reason: HOSPADM

## 2019-10-02 RX ORDER — NALOXONE HYDROCHLORIDE 0.4 MG/ML
0.2 INJECTION, SOLUTION INTRAMUSCULAR; INTRAVENOUS; SUBCUTANEOUS AS NEEDED
Status: DISCONTINUED | OUTPATIENT
Start: 2019-10-02 | End: 2019-10-02 | Stop reason: HOSPADM

## 2019-10-02 RX ORDER — SODIUM CHLORIDE 9 MG/ML
100 INJECTION, SOLUTION INTRAVENOUS CONTINUOUS
Status: DISCONTINUED | OUTPATIENT
Start: 2019-10-02 | End: 2019-10-03 | Stop reason: HOSPADM

## 2019-10-02 RX ORDER — AMOXICILLIN 250 MG
1 CAPSULE ORAL DAILY
Qty: 30 TAB | Refills: 0 | Status: SHIPPED | OUTPATIENT
Start: 2019-10-02 | End: 2020-08-25 | Stop reason: SDUPTHER

## 2019-10-02 RX ORDER — OXYCODONE HYDROCHLORIDE 5 MG/1
5 TABLET ORAL
Status: DISCONTINUED | OUTPATIENT
Start: 2019-10-02 | End: 2019-10-02 | Stop reason: HOSPADM

## 2019-10-02 RX ORDER — SODIUM CHLORIDE 0.9 % (FLUSH) 0.9 %
5-40 SYRINGE (ML) INJECTION AS NEEDED
Status: DISCONTINUED | OUTPATIENT
Start: 2019-10-02 | End: 2019-10-02 | Stop reason: HOSPADM

## 2019-10-02 RX ORDER — KETOROLAC TROMETHAMINE 30 MG/ML
INJECTION, SOLUTION INTRAMUSCULAR; INTRAVENOUS AS NEEDED
Status: DISCONTINUED | OUTPATIENT
Start: 2019-10-02 | End: 2019-10-02 | Stop reason: HOSPADM

## 2019-10-02 RX ORDER — TRANEXAMIC ACID 100 MG/ML
INJECTION, SOLUTION INTRAVENOUS AS NEEDED
Status: DISCONTINUED | OUTPATIENT
Start: 2019-10-02 | End: 2019-10-02 | Stop reason: HOSPADM

## 2019-10-02 RX ORDER — MIDAZOLAM HYDROCHLORIDE 1 MG/ML
2 INJECTION, SOLUTION INTRAMUSCULAR; INTRAVENOUS ONCE
Status: COMPLETED | OUTPATIENT
Start: 2019-10-02 | End: 2019-10-02

## 2019-10-02 RX ORDER — HYDROMORPHONE HYDROCHLORIDE 2 MG/1
2 TABLET ORAL
Status: DISCONTINUED | OUTPATIENT
Start: 2019-10-02 | End: 2019-10-03 | Stop reason: HOSPADM

## 2019-10-02 RX ORDER — CEFAZOLIN SODIUM/WATER 2 G/20 ML
2 SYRINGE (ML) INTRAVENOUS EVERY 8 HOURS
Status: DISPENSED | OUTPATIENT
Start: 2019-10-02 | End: 2019-10-03

## 2019-10-02 RX ORDER — CITALOPRAM 20 MG/1
20 TABLET, FILM COATED ORAL DAILY
Status: DISCONTINUED | OUTPATIENT
Start: 2019-10-03 | End: 2019-10-03 | Stop reason: HOSPADM

## 2019-10-02 RX ORDER — ACETAMINOPHEN 500 MG
1000 TABLET ORAL EVERY 6 HOURS
Status: DISCONTINUED | OUTPATIENT
Start: 2019-10-03 | End: 2019-10-03 | Stop reason: HOSPADM

## 2019-10-02 RX ORDER — AMOXICILLIN 250 MG
2 CAPSULE ORAL DAILY
Status: DISCONTINUED | OUTPATIENT
Start: 2019-10-03 | End: 2019-10-03 | Stop reason: HOSPADM

## 2019-10-02 RX ORDER — NALOXONE HYDROCHLORIDE 0.4 MG/ML
.2-.4 INJECTION, SOLUTION INTRAMUSCULAR; INTRAVENOUS; SUBCUTANEOUS
Status: DISCONTINUED | OUTPATIENT
Start: 2019-10-02 | End: 2019-10-03 | Stop reason: HOSPADM

## 2019-10-02 RX ORDER — PRAVASTATIN SODIUM 20 MG/1
40 TABLET ORAL
Status: DISCONTINUED | OUTPATIENT
Start: 2019-10-02 | End: 2019-10-03 | Stop reason: HOSPADM

## 2019-10-02 RX ORDER — SODIUM CHLORIDE 0.9 % (FLUSH) 0.9 %
5-40 SYRINGE (ML) INJECTION EVERY 8 HOURS
Status: DISCONTINUED | OUTPATIENT
Start: 2019-10-02 | End: 2019-10-03 | Stop reason: HOSPADM

## 2019-10-02 RX ORDER — HYDROMORPHONE HYDROCHLORIDE 2 MG/ML
0.5 INJECTION, SOLUTION INTRAMUSCULAR; INTRAVENOUS; SUBCUTANEOUS
Status: DISCONTINUED | OUTPATIENT
Start: 2019-10-02 | End: 2019-10-02 | Stop reason: HOSPADM

## 2019-10-02 RX ORDER — ACETAMINOPHEN 500 MG
1000 TABLET ORAL
Qty: 60 TAB | Refills: 0 | Status: SHIPPED | OUTPATIENT
Start: 2019-10-02

## 2019-10-02 RX ORDER — CYCLOBENZAPRINE HCL 10 MG
10 TABLET ORAL 2 TIMES DAILY
Status: DISCONTINUED | OUTPATIENT
Start: 2019-10-02 | End: 2019-10-03 | Stop reason: HOSPADM

## 2019-10-02 RX ORDER — ONDANSETRON 2 MG/ML
INJECTION INTRAMUSCULAR; INTRAVENOUS AS NEEDED
Status: DISCONTINUED | OUTPATIENT
Start: 2019-10-02 | End: 2019-10-02 | Stop reason: HOSPADM

## 2019-10-02 RX ORDER — ACETAMINOPHEN 500 MG
1000 TABLET ORAL ONCE
Status: DISCONTINUED | OUTPATIENT
Start: 2019-10-02 | End: 2019-10-02 | Stop reason: HOSPADM

## 2019-10-02 RX ADMIN — EPHEDRINE SULFATE 5 MG: 50 INJECTION, SOLUTION INTRAVENOUS at 07:44

## 2019-10-02 RX ADMIN — MIDAZOLAM 2 MG: 1 INJECTION INTRAMUSCULAR; INTRAVENOUS at 06:52

## 2019-10-02 RX ADMIN — ONDANSETRON 4 MG: 2 INJECTION INTRAMUSCULAR; INTRAVENOUS at 08:43

## 2019-10-02 RX ADMIN — Medication 3 AMPULE: at 06:00

## 2019-10-02 RX ADMIN — ASPIRIN 81 MG: 81 TABLET, COATED ORAL at 21:39

## 2019-10-02 RX ADMIN — FENTANYL CITRATE 25 MCG: 50 INJECTION, SOLUTION INTRAMUSCULAR; INTRAVENOUS at 07:17

## 2019-10-02 RX ADMIN — KETOROLAC TROMETHAMINE 15 MG: 15 INJECTION, SOLUTION INTRAMUSCULAR; INTRAVENOUS at 15:49

## 2019-10-02 RX ADMIN — EPHEDRINE SULFATE 10 MG: 50 INJECTION, SOLUTION INTRAVENOUS at 08:06

## 2019-10-02 RX ADMIN — Medication 2 G: at 07:34

## 2019-10-02 RX ADMIN — KETOROLAC TROMETHAMINE 15 MG: 15 INJECTION, SOLUTION INTRAMUSCULAR; INTRAVENOUS at 21:41

## 2019-10-02 RX ADMIN — Medication 1 AMPULE: at 21:38

## 2019-10-02 RX ADMIN — SODIUM CHLORIDE, SODIUM LACTATE, POTASSIUM CHLORIDE, AND CALCIUM CHLORIDE: 600; 310; 30; 20 INJECTION, SOLUTION INTRAVENOUS at 08:02

## 2019-10-02 RX ADMIN — GABAPENTIN 300 MG: 300 CAPSULE ORAL at 15:48

## 2019-10-02 RX ADMIN — GABAPENTIN 300 MG: 300 CAPSULE ORAL at 21:40

## 2019-10-02 RX ADMIN — LIDOCAINE HYDROCHLORIDE 0.1 ML: 10 INJECTION, SOLUTION INFILTRATION; PERINEURAL at 06:01

## 2019-10-02 RX ADMIN — ACETAMINOPHEN 1000 MG: 10 INJECTION, SOLUTION INTRAVENOUS at 17:43

## 2019-10-02 RX ADMIN — BUPIVACAINE HYDROCHLORIDE 1.8 ML: 7.5 INJECTION, SOLUTION INTRASPINAL at 07:22

## 2019-10-02 RX ADMIN — Medication 10 ML: at 21:42

## 2019-10-02 RX ADMIN — SODIUM CHLORIDE, SODIUM LACTATE, POTASSIUM CHLORIDE, AND CALCIUM CHLORIDE 100 ML/HR: 600; 310; 30; 20 INJECTION, SOLUTION INTRAVENOUS at 06:04

## 2019-10-02 RX ADMIN — EPHEDRINE SULFATE 10 MG: 50 INJECTION, SOLUTION INTRAVENOUS at 07:52

## 2019-10-02 RX ADMIN — Medication 2 G: at 15:50

## 2019-10-02 RX ADMIN — TRANEXAMIC ACID 1000 MG: 100 INJECTION, SOLUTION INTRAVENOUS at 08:51

## 2019-10-02 RX ADMIN — Medication 5 ML: at 17:43

## 2019-10-02 RX ADMIN — CYCLOBENZAPRINE HYDROCHLORIDE 10 MG: 10 TABLET, FILM COATED ORAL at 17:43

## 2019-10-02 RX ADMIN — HYDROMORPHONE HYDROCHLORIDE 2 MG: 2 TABLET ORAL at 11:43

## 2019-10-02 RX ADMIN — PROPOFOL 50 MCG/KG/MIN: 10 INJECTION, EMULSION INTRAVENOUS at 07:29

## 2019-10-02 RX ADMIN — EPHEDRINE SULFATE 10 MG: 50 INJECTION, SOLUTION INTRAVENOUS at 07:41

## 2019-10-02 RX ADMIN — EPHEDRINE SULFATE 10 MG: 50 INJECTION, SOLUTION INTRAVENOUS at 08:01

## 2019-10-02 RX ADMIN — SODIUM CHLORIDE 100 ML/HR: 900 INJECTION, SOLUTION INTRAVENOUS at 17:42

## 2019-10-02 RX ADMIN — TRANEXAMIC ACID 1300 MG: 650 TABLET ORAL at 15:49

## 2019-10-02 RX ADMIN — HYDROMORPHONE HYDROCHLORIDE 2 MG: 2 TABLET ORAL at 21:42

## 2019-10-02 RX ADMIN — ONDANSETRON 4 MG: 4 TABLET, ORALLY DISINTEGRATING ORAL at 11:44

## 2019-10-02 RX ADMIN — HYDROMORPHONE HYDROCHLORIDE 2 MG: 2 TABLET ORAL at 15:48

## 2019-10-02 RX ADMIN — TRANEXAMIC ACID 1300 MG: 650 TABLET ORAL at 11:43

## 2019-10-02 RX ADMIN — TRANEXAMIC ACID 1000 MG: 100 INJECTION, SOLUTION INTRAVENOUS at 07:36

## 2019-10-02 RX ADMIN — PRAVASTATIN SODIUM 40 MG: 20 TABLET ORAL at 21:41

## 2019-10-02 NOTE — PROGRESS NOTES
Care Management Interventions  PCP Verified by CM: Yes  Mode of Transport at Discharge: Self  Transition of Care Consult (CM Consult): [address]: Yes  Discharge Durable Medical Equipment: Yes  Physical Therapy Consult: Yes  Occupational Therapy Consult: Yes  Current Support Network: Own Home  Confirm Follow Up Transport: Family  Plan discussed with Pt/Family/Caregiver: Yes  Freedom of Choice Offered: Yes  Discharge Location  Discharge Placement: Home with home health    Patient is a 79y.o. year old female admitted for Right AIDEN . Patient  plans to return home on discharge. Order received to arrange home health. Patient without preference towards agency. Referral sent to [facility]. . Patient requesting we arrange a walker and raised toilet seat. Referral sent to [facility] who will deliver to the hospital room prior to discharge. Will follow until discharge.

## 2019-10-02 NOTE — ANESTHESIA POSTPROCEDURE EVALUATION
Procedure(s):  RIGHT HIP ARTHROPLASTY TOTAL / KENY.    spinal    Anesthesia Post Evaluation      Multimodal analgesia: multimodal analgesia used between 6 hours prior to anesthesia start to PACU discharge  Patient location during evaluation: PACU  Patient participation: complete - patient participated  Level of consciousness: awake and alert  Pain management: adequate  Airway patency: patent  Anesthetic complications: no  Cardiovascular status: acceptable and hemodynamically stable  Respiratory status: acceptable  Hydration status: acceptable        Vitals Value Taken Time   /72 10/2/2019  9:57 AM   Temp 36.3 °C (97.4 °F) 10/2/2019  9:24 AM   Pulse 63 10/2/2019  9:57 AM   Resp 16 10/2/2019  9:57 AM   SpO2 94 % 10/2/2019  9:57 AM

## 2019-10-02 NOTE — PROGRESS NOTES
[address]  Face to Face Encounter    Patients Name: Flavia Mckeon    YOB: 1950    Ordering Physician: Tony Ge    Primary Diagnosis: Localized osteoarthrosis of right hip [M16.11]  Osteoarthritis of right hip [M16.11]  S/p right AIDEN    Date of Face to Face:   10/2/2019                                  Face to Face Encounter findings are related to primary reason for home care:   yes. 1. I certify that the patient needs intermittent care as follows: physical therapy: gait/stair training    2. I certify that this patient is homebound, that is: 1) patient requires the use of a walker device, special transportation, or assistance of another to leave the home; or 2) patient's condition makes leaving the home medically contraindicated; and 3) patient has a normal inability to leave the home and leaving the home requires considerable and taxing effort. Patient may leave the home for infrequent and short duration for medical reasons, and occasional absences for non-medical reasons. Homebound status is due to the following functional limitations: Patient's ambulation limited secondary to severe pain and requires the use of an assistive device and the assistance of a caregiver for safe completion. Patient with strength and ROM deficits limiting ambulation endurance requiring the use of an assistive device and the assistance of a caregiver. Patient deemed temporarily homebound secondary to increased risk for infection when leaving home and going out into the community. 3. I certify that this patient is under my care and that I, or a nurse practitioner or  [id number], or clinical nurse specialist, or certified nurse midwife, working with me, had a Face-to-Face Encounter that meets the physician Face-to-Face Encounter requirements.   The following are the clinical findings from the [address] [place] [address] encounter that support the need for skilled services and is a summary of the encounter: see hospital chart          Judd EucedaKEYLA  10/2/2019      THE FOLLOWING TO BE COMPLETED BY THE COMMUNITY PHYSICIAN:    I concur with the findings described above from the F2F encounter that this patient is homebound and in need of a skilled service.     Certifying Physician: _____________________________________      Printed Certifying Physician Name: _____________________________________    Date: _________________

## 2019-10-02 NOTE — PERIOP NOTES
TRANSFER - OUT REPORT:    Verbal report given to ARYAN HEREDIA [facility] RN on Daija Ast  being transferred to preop holding for routine progression of care       Report consisted of patients Situation, Background, Assessment and   Recommendations(SBAR). Information from the following report(s) SBAR, MAR and Recent Results was reviewed with the receiving nurse. Lines:   Peripheral IV 10/02/19 Left Hand (Active)   Site Assessment Clean, dry, & intact 10/2/2019  5:54 AM   Phlebitis Assessment 0 10/2/2019  5:54 AM   Infiltration Assessment 0 10/2/2019  5:54 AM   Dressing Status Clean, dry, & intact 10/2/2019  5:54 AM   Dressing Type Tape;Transparent 10/2/2019  5:54 AM   Hub Color/Line Status Green; Infusing 10/2/2019  5:54 AM        Opportunity for questions and clarification was provided. Patient transported with:   Tech       and not on any diabetic meds.

## 2019-10-02 NOTE — H&P
[address] [facility]  Pre Operative History and Physical Exam    Patient ID:  Alberto Alfredo  [id number]  89 y.o.  1950    Today: October 2, 2019       CC:  Right hip pain    HPI:   The patient has end stage arthritis of the right hip. The patient was evaluated and examined in the office prior to today and was found to have a history on physical exam consistent with intra-articular pathology of the right hip. Patient complains of anterior groin pain predominately. Pain occurs during activity. Patient has difficulty putting on socks/shoes and has notable pain when getting up from a chair and getting out of a car. Patient does not complain of significant lateral hip pain or radicular pain down the leg. There have been no changes to the patient's orthopedic condition since the last office visit    Past Medical History:  Past Medical History:   Diagnosis Date    Adverse effect of anesthesia     slow to wake up     Arthritis     BMI 34.0-34.9,adult     Depression     on med for control     Former smoker     Hypercholesterolemia     on med for control     Hypertension     on med for control     Neuropathy        Past Surgical History:  Past Surgical History:   Procedure Laterality Date    HX COLONOSCOPY      x2    HX PARTIAL HYSTERECTOMY  1990's        Medications:     Prior to Admission medications    Medication Sig Start Date End Date Taking? Authorizing Provider   naproxen sodium (ALEVE PO) Take 1-2 Tabs by mouth daily as needed for Pain. Yes Provider, Historical   multivitamin with minerals (HAIR,SKIN AND NAILS PO) Take 1 Tab by mouth daily. Yes Provider, Historical   cyanocobalamin 1,000 mcg tablet Take 1,000 mcg by mouth daily. Yes Provider, Historical   cholecalciferol (VITAMIN D3) 1,000 unit tablet Take 1,000 Units by mouth daily.    Yes Provider, Historical   ibuprofen (MOTRIN) 800 mg tablet Take 1 Tab by mouth every eight (8) hours as needed for Pain (use sparingly, only if needed). Take 1/2 to 1 tab PO 8 hours PRN 9/3/19  Yes Zelda Euceda MD   gabapentin (NEURONTIN) 300 mg capsule Take 1 Cap by mouth three (3) times daily. PRN pain, use sparingly 9/3/19  Yes Zelda Euceda MD   amLODIPine (NORVASC) 10 mg tablet Take 1 Tab by mouth daily. Take 1/2 tab PO once daily for one week, then increase to full tab once daily thereafter 9/3/19  Yes Zelda Euceda MD   pravastatin (PRAVACHOL) 40 mg tablet Take 1 Tab by mouth nightly. 19  Yes Zelda Euceda MD   citalopram (CELEXA) 20 mg tablet Take 1 Tab by mouth daily. Take 1/2 tab PO once daily for 2 weeks, then increase to a whole tab once daily 19  Yes Zelda Euceda MD   cyclobenzaprine (FLEXERIL) 10 mg tablet Take 1 Tab by mouth two (2) times a day. 19  Yes Azael Denson MD   butalbital-acetaminophen-caffeine (FIORICET, ESGIC) -40 mg per tablet Take 1 Tab by mouth daily as needed for Pain.  18   Azael Denson MD       Family History:     Family History   Problem Relation Age of Onset    Cancer Mother    [facility] Breast Cancer Mother 79    Heart Attack Father     Cancer Sister        Social History:      Social History     Tobacco Use    Smoking status: Former Smoker     Packs/day: 0.50     Years: 20.00     Pack years: 10.00     Last attempt to quit: 2019     Years since quittin.2    Smokeless tobacco: Never Used   Substance Use Topics    Alcohol use: Yes     Comment: occasionally          Allergies:    No Known Allergies     Vitals:      Visit Vitals  BP (!) 121/93 (BP 1 Location: Right arm, BP Patient Position: At rest)   Pulse 86   Temp 97.6 °F (36.4 °C)   Resp 18   Ht 5' 5\" (1.651 m)   Wt 93 kg (205 lb)   SpO2 95%   BMI 34.11 kg/m²        Objective:         General: No Acute distress                   HEENT: Normocephalic/atramatic                   Lungs:  Breathing non-labored                   Heart:   RRR                    Abdomen: soft       Extremities: Pain with ROM of the right hip which manifests as anterior groin pain. There is decreased internal and external rotation of the affected hip. No significant pain with palpation over the greater trochanteric bursa. No radicular pain with straight leg raise. Patient walks with and antalgic gait. Distally patient has no neurologic deficit. Patient Active Problem List   Diagnosis Code    Essential hypertension I10    Mixed hyperlipidemia E78.2    Chronic pain of right knee M25.561, G89.29    Mild episode of recurrent major depressive disorder ([facility] [address] F33.0    Neuropathy G62.9    Tobacco abuse Z72.0    Severe obesity ([facility]) E66.01    Snoring R06.83    Osteoarthritis of right hip M16.11       Assessment:   1. Arthritis of the Right hip    Plan:   The patient has failed previous conservative treatment for this condition. The patient has pain in the right hip which causes daily symptoms which affects the patient's activities of daily living and quality of life. The risks, benefits, alternatives and possible complications of right total hip arthroplasty have been discussed with the patient including but not limited to infection, bleeding, damage to nerves and blood vessels with particular attention given to risk of damage of the femoral, obturator, lateral femoral cutaneous, superior gluteal, inferior gluteal, and sciatic nerve, risk of dislocation, fracture both intra-op and post-op, limb length inequality, polyethylene wear, implant loosening, risk for continued pain, and risk for revision surgery secondary to but not limited to all of these discussed risks. Further we discussed risk of venous thrombo-embolism including deep vein thrombosis and pulmonary embolism despite being on prophylaxis.  We have also previously discussed the potential of morbidity and mortality associated with, but not limited to, the actual surgical procedure, anesthesia, prior medical conditions, and/or the administration of medications perioperatively. I have made no guarantees to the patient regarding outcomes and the patient has voiced understanding of that. The patient has been given the opportunity to ask questions all of which have been answered and the patient wishes to proceed. The patient will be admitted the day of surgery for right total hip replacement.         Signed By: Jung Jaimes MD  October 2, 2019

## 2019-10-02 NOTE — OP NOTES
Total Hip Procedure Note    Shannan Gtz,  [id number],  1950    Pre-operative Diagnosis: Localized osteoarthrosis of right hip [M16.11]        Post-operative Diagnosis: Same    Procedure: Right Total Hip Arthroplasty    BMI: Body mass index is 34.11 kg/m². Nikita Turner Location: [address] [place] [address]    Surgeon: Jeremiah Dong MD    Assistant: Marty Monroy CFA    Anesthesia: Spinal     Complications: None    EBL: 200cc    Drains: None    Intra-op Findings: Pre-operatively leg lengths were assessed using preop Xrays and with the patient in the lateral decubitus position and operative leg was felt to be similar in length compared to the contralateral leg. The operative hip showed notable cartilage loss of both the femoral head and acetabulum. No intra-operative periprosthetic fracture was encountered. Sciatic nerve was noted to be intact at the end of the procedure. We measured the distance of our resected femoral head/neck from the cut surface to the center of the femoral head to be approximately 32mm. The overall length replaced with the implanted head/neck was 31mm. Intra-operatively we felt that we restored the patients leg lengths to equal lengths using the method described later. Postop with the patient supine we assessed leg lengths and felt they were similar. Patient condition at completion of Procedure: Stable    Implants:   Implant Name Type Inv.  Item Serial No.  Lot No. LRB No. Used   SHELL ACET CLUS H 50D TRTANIUM -- TRIDENT II - [id number]  SHELL ACET CLUS H 50D TRTANIUM -- [facility] HOW [id number] Right 1   SCR BNE ACET CANC 6.5X35MM -- TRIDENT - [id number]  SCR BNE ACET CANC 6.5X35MM -- TRIDENT  [employer] HOWM [id number] Right 1   INSERT ACET 0DEG 32MM D X3 --  - [id number]  INSERT ACET 0DEG 32MM D X3 --   [employer] HOWM [id number] Right 1   STEM FEM SZ 4 127D [id number] -- ACCOLADE II V40 - [id number]  STEM FEM SZ 4 127D [id number] -- ACCOLADE II V40 [employer] HOW [id number] Right 1   HEAD FEM DELT V40 -4MM NK 32MM -- V40 BIOLOX - [id number]  HEAD FEM DELT V40 -4MM NK 32MM -- V40 BIOLOX  [employer] HOW [id number] Right 1       Description of Procedure    The complexity of the total joint surgery requires the use of a first assistant for positioning, retraction and assistance in closure. Dusty Dockery was brought to the operating room. Patient was transferred from the stretcher to OR bed. Spinal anesthetic was induced. IV antibiotics were administered per protocol. Sean Andres was positioned in the lateral decubitus position and the pelvis/torso stabilized with posts. The limb was prepped and draped in the usual sterile fashion. A time out identifying the patient, procedure, operative side and surgeon was administered and charted by the OR Nurse. Prior to incision one gram of TXA was given intravenously. A standard posterior approach was utilized to expose the hip. The incision was carried through the subcutaneous tissue and underlying fascia with hemostasis obtained using the bovie cauterization and Aquamantys. A Charmley retractor was inserted. We resected any redundent bursal tissue off the external rotators. We were able to identify the piriformis tendon. The short external rotators and capsule were dissected off the posterior femur as a single layer just superior to the piriformis tendon. The sciatic nerve was palpated and protected during dissection. The femoral head was dislocated. The articular surface revealed loss of cartilage, exposed bone and bone spurring. The neck was osteotomized approximately 1cm above the top of the lesser trochanter. We were careful to protect the greater trochanter during osteotomy and protect it from iatrogenic injury. Acetabular retractors were placed both anterior and posterior just superficial to the acetabular labrum.   Remaining acetabular labrum was resected and any soft tissue was removed from the acetabulum including any tissue within the codyloid fossa. The acetabular surface revealed loss of cartilage with exposed bone. The acetabulum was sequentially reamed noting proper anteversion and inclination during reaming. The transverse acetabular ligament was used as the primary anatomic landmark to determine version and inclination. The acetabulum was sized to a 50 mm acetabular component. The prepared acetabulum was irrigated of any residual reamings and soft tissue. The permanent acetabular component was impacted into place to achieve appropriate horizontal tilt, anteversion, bone coverage and stability. We visualize that the acetabular component was fully seated. A trial liner was impacted into position. We did not have to excise overhanging osteophytes anterior and posterior  in order to minimize the risk of impingement. We then turned our attention to the proximal femur. A 2-prong proximal femoral retractor was placed. We gained access to the proximal femur using a  and femoral canal finder. The canal was prepared with appropriate lateralization in which we used the initial smaller broaches to remove lateral bone to avoid varus placement of the femoral component. The canal was then broached progressively. The broach was positioned with the appropriate anatomic femoral anteversion. We broached up to a size 4 Accolade II stem. A calcar planar was used. A trial reduction with a size #4 127 degree Accolade II stem with a -4 neck length and 32 mm head was performed. This was found to be the most stable to flexion greater than 90 degrees and on internal and external rotation. Limb lengths were assessed using three techniques. First, the position of the tip of the operative greater trochanter was compared to the center of the femoral head component and was felt to match this same anatomic relationship as the non-operative hip based on preoperative X-rays.  Next, we measured the length of our resected femoral head neck and felt that the trial components matched this resected length as closely as possible when accounting for the length provided by the femoral neck/head. Finally, we compared leg lengths by comparing the possible of the patella with the patients heels together with the patient in the lateral decubitus position. Using these methods it was felt that the patients leg lengths were equilibrated and with appropriate stability as mentioned above. All trial components were removed. Prior to final polyethylene insertion one screw was placed to further stabilize the cup. The final liner was impacted into place which was a neutral liner. A cementless size 4 127 degree Accolade II stem was impacted into place carefully. We were careful to observe the calcar region for any iatrogenic fractures during insertion. The permanent femoral head was impacted into place which was a -4mm 32mm ceramic head. Janel Gardner's hip was reduced and stability was as mentioned above. Dilute Betadine solution was allowed to sit in the surgical site for a 3 minute period and copious saline was used to irrigate the surgical site. The sciatic nerve was palpated and noted to be intact. A periarticular of ropivicaine, toradol, and morphine was injected about the surgical site with care being taken not to inject in the vicinity of neurovascular structures. Prior to wound closure one additional gram of TXA was given for a total of 2 grams. The capsule was repaired with a three #2 Fiberwires secured through drill holes placed in the posterior aspect of the trochanter. No drain was placed. The fascia was closed with a  #0 Bidirectional Stratafix barbed suture. The sub-Q layer was closed with 2-0 Vicryl suture and a  3-0 moncril stratafix suture in a running subcuticular fashion was used to close the skin.  The incision site was thoroughly cleaned with alcohol and the Exofin wound closure system was applied to seal it off from external contamination after the overlying skin was thoroughly cleaned with alcohol. A thin layer of [place] lubricant was applied over the wound to keep the dressing from adhering to the overlying sterile bandage and said bandage was placed. Drapes were then broken down and patient was transferred carefully back to the OR stretcher. The sponge and needle counts were correct. The patient tolerated the procedure without difficulty and left the operating room in satisfactory condition.     Signed By: Angel Myers MD

## 2019-10-02 NOTE — ANESTHESIA PROCEDURE NOTES
Spinal Block    Start time: 10/2/2019 7:18 AM  End time: 10/2/2019 7:22 AM  Performed by: Mariluz Newell MD  Authorized by: Mariluz Newell MD     Pre-procedure:   Indications: at surgeon's request and primary anesthetic  Preanesthetic Checklist: patient identified, risks and benefits discussed, anesthesia consent, site marked, patient being monitored and timeout performed      Spinal Block:   Patient Position:  Seated  Prep Region:  Lumbar  Prep: chlorhexidine      Location:  L3-4  Technique:  Single shot        Needle:   Needle Type:  Pencan  Needle Gauge:  25 G  Attempts:  1      Events: CSF confirmed, no blood with aspiration and no paresthesia        Assessment:  Insertion:  Uncomplicated  Patient tolerance:  Patient tolerated the procedure well with no immediate complications

## 2019-10-02 NOTE — PROGRESS NOTES
Problem: Self Care Deficits Care Plan (Adult)  Goal: *Acute Goals and Plan of Care (Insert Text)  Description  GOALS:   DISCHARGE GOALS (in preparation for going home/rehab):  3 days  1. Ms. Wale Pastrana will perform one lower body dressing activity with minimal assistance with adaptive equipment to demonstrate improved functional mobility and safety. 2.  Ms. Wale Pastrana will perform one lower body bathing activity with minimal  assistance with adaptive equipment to demonstrate improved functional mobility and safety. 3.  Ms. Wale Pastrana will perform toileting/toilet transfer with contact guard assistance with adaptive equipment to demonstrate improved functional mobility and safety. 4.  Ms. Wale Pastrana will perform shower transfer with contact guard assistance with adaptive equipment to demonstrate improved functional mobility and safety. 5.  Ms. Wale Pastrana will state AIDEN precautions with two verbal cues to demonstrate improved functional mobility and safety. Outcome: Progressing Towards Goal         JOINT CAMP OCCUPATIONAL THERAPY AIDEN: Initial Assessment and AM 10/2/2019  INPATIENT: Hospital Day: 1  Payor: Devora Almonte / Plan: [employer] / Product Type: [employer] /      NAME/AGE/GENDER: Alexys Arenas is a 79 y.o. female   PRIMARY DIAGNOSIS:  Localized osteoarthrosis of right hip [M16.11]   Procedure(s) and Anesthesia Type:     * RIGHT HIP ARTHROPLASTY TOTAL / [facility] [place] - Spinal (Right)  ICD-10: Treatment Diagnosis:    Pain in Right Hip (M25.551)  Stiffness of Right Hip, Not elsewhere classified (M25.651)  Other lack of cordination (R27.8)      ASSESSMENT:     Ms. Wale Pastrana is s/p right AIDNE and presents with decreased weight bearing on right LE and decreased independence with functional mobility and activities of daily living as compared to prior level of function and safety.   Patient would benefit from skilled Occupational Therapy to maximize independence and safety with self-care task and functional mobility. Pt would also benefit from education on lower body adaptive equipment and hip precautions post-surgery in preparation for going home. Patient plans for further rehab at home with home health services and good family support. OT reviewed therapy schedule and plan of care with patient. Patient was able to transfer and perform self care skills as charted below. Patient instructed to call for assistance when needing to get up from the recliner and all needs in reach. Patient verbalized understanding of call light. She declined having to use the restroom and did not want to don any additional clothing. This section established at most recent assessment   PROBLEM LIST (Impairments causing functional limitations):  Decreased Strength  Decreased ADL/Functional Activities  Decreased Transfer Abilities  Increased Pain  Increased Fatigue  Decreased Flexibility/Joint Mobility  Decreased Knowledge of Precautions   INTERVENTIONS PLANNED: (Benefits and precautions of occupational therapy have been discussed with the patient.)  Activities of daily living training  Adaptive equipment training  Balance training  Clothing management  Donning&doffing training  Theraputic activity     TREATMENT PLAN: Frequency/Duration: Follow patient 1-2 times to address above goals. Rehabilitation Potential For Stated Goals: Good     RECOMMENDED REHABILITATION/EQUIPMENT: (at time of discharge pending progress): Continue Skilled Therapy. OCCUPATIONAL PROFILE AND HISTORY:   History of Present Injury/Illness (Reason for Referral): Pt presents this date s/p (right) AIDEN. Past Medical History/Comorbidities:   Ms. Cyril Rodriguez  has a past medical history of Adverse effect of anesthesia, Arthritis, BMI 34.0-34.9,adult, Depression, Former smoker, Hypercholesterolemia, Hypertension, and Neuropathy.  She also has no past medical history of Difficult intubation, Malignant hyperthermia due to anesthesia, Nausea & vomiting, or Pseudocholinesterase deficiency. Ms. Cyril Rodriguez  has a past surgical history that includes hx partial hysterectomy (1990's) and hx colonoscopy. Social History/Living Environment:      Prior Level of Function/Work/Activity:  Mod I with ADLS     Number of Personal Factors/Comorbidities that affect the Plan of Care: Brief history (0):  LOW COMPLEXITY   ASSESSMENT OF OCCUPATIONAL PERFORMANCE[de-identified]   Most Recent Physical Functioning:   Balance  Sitting: Intact  Standing: Pull to stand; With support                    Coordination  Fine Motor Skills-Upper: Left Intact; Right Intact  Gross Motor Skills-Upper: Left Intact; Right Intact         Mental Status  Neurologic State: Alert; Appropriate for age  Orientation Level: Appropriate for age  Cognition: Appropriate decision making; Appropriate for age attention/concentration; Appropriate safety awareness; Follows commands  Perception: Appears intact  Perseveration: No perseveration noted  Safety/Judgement: Awareness of environment; Fall prevention                Basic ADLs (From Assessment) Complex ADLs (From Assessment)   Basic ADL  Feeding: Supervision  Oral Facial Hygiene/Grooming: Supervision  Bathing: Moderate assistance  Upper Body Dressing: Supervision  Lower Body Dressing: Moderate assistance  Toileting: Minimum assistance     Grooming/Bathing/Dressing Activities of Daily Living     Cognitive Retraining  Safety/Judgement: Awareness of environment; Fall prevention                 Functional Transfers  Toilet Transfer : Minimum assistance; Moderate assistance  Shower Transfer: Minimum assistance; Moderate assistance     Bed/Mat Mobility  Supine to Sit: Contact guard assistance  Sit to Stand: Minimum assistance  Stand to Sit: Minimum assistance  Bed to Chair: Minimum assistance  Scooting: Contact guard assistance         Physical Skills Involved:  Balance  Strength  Pain (acute) Cognitive Skills Affected (resulting in the inability to perform in a timely and safe manner):   WFL Psychosocial Skills Affected:  none    Number of elements that affect the Plan of Care: 1-3:  LOW COMPLEXITY   CLINICAL DECISION MAKIN[address] [place] [id number] AM-PAC 6 Clicks   Daily Activity Inpatient Short Form  How much help from another person does the patient currently need. .. Total A Lot A Little None   1. Putting on and taking off regular lower body clothing? ? 1   ? 2   ? 3   ? 4   2. Bathing (including washing, rinsing, drying)? ? 1   ? 2   ? 3   ? 4   3. Toileting, which includes using toilet, bedpan or urinal?   ? 1   ? 2   ? 3   ? 4   4. Putting on and taking off regular upper body clothing? ? 1   ? 2   ? 3   ? 4   5. Taking care of personal grooming such as brushing teeth? ? 1   ? 2   ? 3   ? 4   6. Eating meals? ? 1   ? 2   ? 3   ? 4   © , Trustees of [address] [place] [address], under license to [email or web address]. All rights reserved     Score:  Initial: 19 Most Recent: X (Date: -- )    Interpretation of Tool:  Represents activities that are increasingly more difficult (i.e. Bed mobility, Transfers, Gait). Medical Necessity:     Patient is expected to demonstrate progress in self care skills  Reason for Services/Other Comments:  Patient continues to require skilled intervention due to s/p R AIDNE   . Use of outcome tool(s) and clinical judgement create a POC that gives a: LOW COMPLEXITY            TREATMENT:   (In addition to Assessment/Re-Assessment sessions the following treatments were rendered)     Pre-treatment Symptoms/Complaints:    Pain: Initial:   Pain Intensity 1: 6  Pain Location 1: Hip  Pain Orientation 1: Right  Pain Intervention(s) 1: Ambulation/Increased Activity  Post Session:  6/10 rest, icepack given     Assessment/Reassessment only, no treatment provided today    Treatment/Session Assessment:     Response to Treatment:  tolerated well, up in recliner working with PT. Education:  ? Home Exercises  ? Fall Precautions  ? Hip Precautions ? Going Home Video  ?  Knee/Hip Prosthesis Review  ? Walker Management/Safety ? Adaptive Equipment as Needed       Interdisciplinary Collaboration:   Physical Therapist  Occupational Therapist  Registered Nurse    After treatment position/precautions:   Up in chair  Bed/Chair-wheels locked  Bed in low position  Caregiver at bedside  Call light within reach  RN notified  Family at bedside     Compliance with Program/Exercises: Compliant all of the time. Recommendations/Intent for next treatment session:  Treatment next visit will focus on increasing Ms. Gardner's independence with bed mobility, transfers, self care, functional mobility, modalities for pain, and patient education.       Total Treatment Duration:  OT Patient Time In/Time Out  Time In: 1145  Time Out: 33 [facility],

## 2019-10-02 NOTE — PROGRESS NOTES
10/02/19 1348   Oxygen Therapy   O2 Sat (%) 95 %   Pulse via Oximetry 80 beats per minute   O2 Device Room air   O2 Flow Rate (L/min) 0 l/min   Incentive Spirometry Treatment   Actual Volume (ml) 1750 ml   Number of Attempts 1    No shortness of breath or distress noted. BS are clear b/l.    [facility] notes reviewed- continuous sat with no number ordered HS

## 2019-10-02 NOTE — PROGRESS NOTES
TRANSFER - OUT REPORT:    Verbal report given to GLENIS Rios) on [facility]  being transferred to [facility] for routine post - op       Report consisted of patients Situation, Background, Assessment and   Recommendations(SBAR). Information from the following report(s) SBAR, Kardex, OR Summary, Intake/Output and MAR was reviewed with the receiving nurse. Lines:   Peripheral IV 10/02/19 Left Hand (Active)   Site Assessment Clean, dry, & intact 10/2/2019 10:12 AM   Phlebitis Assessment 0 10/2/2019 10:12 AM   Infiltration Assessment 0 10/2/2019 10:12 AM   Dressing Status Clean, dry, & intact 10/2/2019 10:12 AM   Dressing Type Transparent;Tape 10/2/2019 10:12 AM   Hub Color/Line Status Green; Infusing 10/2/2019 10:12 AM        Opportunity for questions and clarification was provided.       Patient transported with:   [employer]

## 2019-10-02 NOTE — PERIOP NOTES
Betadine lavage:  17.5cc of betadine lot # [id number] , exp. Date  01/2021,  in 500cc of . 9NS Lot # [id number] , exp.  Date : 04/2022

## 2019-10-02 NOTE — PROGRESS NOTES
Problem: Mobility Impaired (Adult and Pediatric)  Goal: *Acute Goals and Plan of Care (Insert Text)  Description  GOALS (1-4 days):  (1.)Ms. Martinez Mtz will move from supine to sit and sit to supine  in bed with STAND BY ASSIST.    (2.)Ms. Martinez Mtz will transfer from bed to chair and chair to bed with STAND BY ASSIST using the least restrictive device. (3.)Ms. Gardner will ambulate with STAND BY ASSIST for 150 feet with the least restrictive device. (4.)Ms. Gardner will ambulate up/down 2 steps with bilateral  railing with MINIMAL ASSIST with no device. (5.)Ms. Gardner will state/observe AIDEN precautions with 0 verbal cues. ________________________________________________________________________________________________     Outcome: Progressing Towards Goal     PHYSICAL THERAPY JOINT CAMP AIDEN: Initial Assessment and PM 10/2/2019  INPATIENT: Hospital Day: 1  Payor: Louisa Patel / Plan: Lucy Apodaca / Product Type: [employer] /      NAME/AGE/GENDER: Flavia Mckeon is a 79 y.o. female   PRIMARY DIAGNOSIS:  Localized osteoarthrosis of right hip [M16.11]   Procedure(s) and Anesthesia Type:     * RIGHT HIP ARTHROPLASTY TOTAL / Jamia Whitney - Spinal (Right)  ICD-10: Treatment Diagnosis:    Pain in Right Hip (M25.551)  Stiffness of Right Hip, Not elsewhere classified (M25.651)  Difficulty in walking, Not elsewhere classified (R26.2)      ASSESSMENT:     Ms. Martinez Mtz presents with decreased functional mobility and gait as well as decreased rom and strength s/p right aiden. She plans to go home with HHPT.     This section established at most recent assessment   PROBLEM LIST (Impairments causing functional limitations):  Decreased Strength  Decreased ADL/Functional Activities  Decreased Transfer Abilities  Decreased Ambulation Ability/Technique  Decreased Balance  Increased Pain  Decreased Activity Tolerance  Decreased Flexibility/Joint Mobility  Decreased Knowledge of Precautions  Decreased Chancellor with Home Exercise Program  Decreased strength   INTERVENTIONS PLANNED: (Benefits and precautions of physical therapy have been discussed with the patient.)  bed mobility  gait training  home exercise program (HEP)  Range of Motion: active/assisted/passive  Therapeutic Activities  therapeutic exercise/strengthening  transfer training  Group Therapy     TREATMENT PLAN: Frequency/Duration: Follow patient BID for duration of hospital stay to address above goals. Rehabilitation Potential For Stated Goals: Good     RECOMMENDED REHABILITATION/EQUIPMENT: (at time of discharge pending progress): Continue Skilled Therapy and Home Health: Physical Therapy. HISTORY:   History of Present Injury/Illness (Reason for Referral):  S/p right claudia  Past Medical History/Comorbidities:   Ms. Princess Sun  has a past medical history of Adverse effect of anesthesia, Arthritis, BMI 34.0-34.9,adult, Depression, Former smoker, Hypercholesterolemia, Hypertension, and Neuropathy. She also has no past medical history of Difficult intubation, Malignant hyperthermia due to anesthesia, Nausea & vomiting, or Pseudocholinesterase deficiency. Ms. Princess Sun  has a past surgical history that includes hx partial hysterectomy (1990's) and hx colonoscopy.   Social History/Living Environment:      Prior Level of Function/Work/Activity:  Using quad cane with gait   Number of Personal Factors/Comorbidities that affect the Plan of Care: 1-2: MODERATE COMPLEXITY   EXAMINATION:   Most Recent Physical Functioning:      Gross Assessment  AROM: Within functional limits(left LE)  Strength: Generally decreased, functional(left LE)        RLE AROM  R Hip Flexion: 80  R Hip ABduction: 5            Bed Mobility  Supine to Sit: Contact guard assistance  Scooting: Contact guard assistance    Transfers  Sit to Stand: Minimum assistance  Stand to Sit: Minimum assistance  Bed to Chair: Minimum assistance    Balance  Sitting: Intact  Standing: With support;Pull to stand Weight Bearing Status  Right Side Weight Bearing: As tolerated  Distance (ft): 50 Feet (ft)  Ambulation - Level of Assistance: Minimal assistance;Contact guard assistance  Assistive Device: Walker, rolling  Step Length: Left shortened;Right shortened  Stance: Right decreased  Gait Abnormalities: Antalgic  Interventions: Manual cues; Safety awareness training; Tactile cues; Verbal cues     Braces/Orthotics:     Right Hip Cold  Type: Cold/ice packs      Body Structures Involved:  Bones  Joints  Muscles  Ligaments Body Functions Affected: Movement Related Activities and Participation Affected: Mobility   Number of elements that affect the Plan of Care: 3: MODERATE COMPLEXITY   CLINICAL PRESENTATION:   Presentation: Stable and uncomplicated: LOW COMPLEXITY   CLINICAL DECISION MAKIN[address] [place] [address] AM-PAC 6 Clicks   Basic Mobility Inpatient Short Form  How much difficulty does the patient currently have. .. Unable A Lot A Little None   1. Turning over in bed (including adjusting bedclothes, sheets and blankets)? ? 1   ? 2   ? 3   ? 4   2. Sitting down on and standing up from a chair with arms ( e.g., wheelchair, bedside commode, etc.)   ? 1   ? 2   ? 3   ? 4   3. Moving from lying on back to sitting on the side of the bed?   ? 1   ? 2   ? 3   ? 4   How much help from another person does the patient currently need. .. Total A Lot A Little None   4. Moving to and from a bed to a chair (including a wheelchair)? ? 1   ? 2   ? 3   ? 4   5. Need to walk in hospital room? ? 1   ? 2   ? 3   ? 4   6. Climbing 3-5 steps with a railing? ? 1   ? 2   ? 3   ? 4   © , Trustees of [address] [place] [address], under license to [employer]. All rights reserved     Score:  Initial: 18 Most Recent: X (Date: -- )    Interpretation of Tool:  Represents activities that are increasingly more difficult (i.e. Bed mobility, Transfers, Gait).     Medical Necessity:     Patient is expected to demonstrate progress in strength, range of motion and balance   to decrease assistance required with exercises and functional mobility   . Reason for Services/Other Comments:  Patient continues to require present interventions due to patient's inability to perform exercises and functional mobility independently   . Use of outcome tool(s) and clinical judgement create a POC that gives a: Clear prediction of patient's progress: LOW COMPLEXITY            TREATMENT:   (In addition to Assessment/Re-Assessment sessions the following treatments were rendered)     Pre-treatment Symptoms/Complaints:  none  Pain Initial:      Post Session:  0     Therapeutic Exercise: (10 Minutes):  Exercises per grid below to improve mobility and strength. Required minimal visual, verbal and manual cues to promote proper body alignment, promote proper body posture and promote proper body mechanics. Progressed range and repetitions as indicated. Date:  10/2 Date:   Date:     ACTIVITY/EXERCISE AM PM AM PM AM PM   GROUP THERAPY  ?  ?  ?  ?  ?  ? Ankle Pumps  10a       Quad Sets  10a       Gluteal Sets  10a       Hip ABd/ADduction  10aa       Straight Leg Raises         Knee Slides  10aa       Short Arc Quads         Long Arc Quads         Chair Slides                  B = bilateral; AA = active assistive; A = active; P = passive      Treatment/Session Assessment:     Response to Treatment:  Pt. Did well. Education:  ? Home Exercises  ? Fall Precautions  ? Hip Precautions ? D/C Instruction Review  ? Hip Prosthesis Review  ? Walker Management/Safety ? Adaptive Equipment as Needed       Interdisciplinary Collaboration:   Occupational Therapist  Registered Nurse    After treatment position/precautions:   Up in chair  Bed/Chair-wheels locked  Bed in low position  Caregiver at bedside  Call light within reach  Family at bedside    Compliance with Program/Exercises: Will assess as treatment progresses.     Recommendations/Intent for next treatment session:  Treatment next visit will focus on increasing Ms. Gardner's independence with bed mobility, transfers, gait training, strength/ROM exercises, modalities for pain, and patient education.       Total Treatment Duration:  PT Patient Time In/Time Out  Time In: 1155  Time Out: 1814 Ankita Escamilla, PT

## 2019-10-03 VITALS
RESPIRATION RATE: 16 BRPM | SYSTOLIC BLOOD PRESSURE: 130 MMHG | OXYGEN SATURATION: 96 % | TEMPERATURE: 97.4 F | BODY MASS INDEX: 34.16 KG/M2 | HEIGHT: 65 IN | WEIGHT: 205 LBS | HEART RATE: 97 BPM | DIASTOLIC BLOOD PRESSURE: 79 MMHG

## 2019-10-03 LAB — HGB BLD-MCNC: 9.5 G/DL (ref 11.7–15.4)

## 2019-10-03 PROCEDURE — 97116 GAIT TRAINING THERAPY: CPT

## 2019-10-03 PROCEDURE — 94760 N-INVAS EAR/PLS OXIMETRY 1: CPT

## 2019-10-03 PROCEDURE — 97110 THERAPEUTIC EXERCISES: CPT

## 2019-10-03 PROCEDURE — 97535 SELF CARE MNGMENT TRAINING: CPT

## 2019-10-03 PROCEDURE — 74011250637 HC RX REV CODE- 250/637: Performed by: ORTHOPAEDIC SURGERY

## 2019-10-03 PROCEDURE — 97150 GROUP THERAPEUTIC PROCEDURES: CPT

## 2019-10-03 PROCEDURE — 36415 COLL VENOUS BLD VENIPUNCTURE: CPT

## 2019-10-03 PROCEDURE — 74011250637 HC RX REV CODE- 250/637: Performed by: NURSE PRACTITIONER

## 2019-10-03 PROCEDURE — 85018 HEMOGLOBIN: CPT

## 2019-10-03 PROCEDURE — 74011250636 HC RX REV CODE- 250/636: Performed by: NURSE PRACTITIONER

## 2019-10-03 RX ADMIN — KETOROLAC TROMETHAMINE 15 MG: 15 INJECTION, SOLUTION INTRAMUSCULAR; INTRAVENOUS at 06:35

## 2019-10-03 RX ADMIN — ASPIRIN 81 MG: 81 TABLET, COATED ORAL at 08:53

## 2019-10-03 RX ADMIN — MELATONIN 1 TABLET: at 08:52

## 2019-10-03 RX ADMIN — CYCLOBENZAPRINE HYDROCHLORIDE 10 MG: 10 TABLET, FILM COATED ORAL at 08:52

## 2019-10-03 RX ADMIN — HYDROMORPHONE HYDROCHLORIDE 2 MG: 2 TABLET ORAL at 11:31

## 2019-10-03 RX ADMIN — Medication 1 AMPULE: at 08:53

## 2019-10-03 RX ADMIN — CITALOPRAM HYDROBROMIDE 20 MG: 20 TABLET ORAL at 08:52

## 2019-10-03 RX ADMIN — ACETAMINOPHEN 1000 MG: 500 TABLET, FILM COATED ORAL at 00:11

## 2019-10-03 RX ADMIN — ACETAMINOPHEN 1000 MG: 500 TABLET, FILM COATED ORAL at 06:35

## 2019-10-03 RX ADMIN — HYDROMORPHONE HYDROCHLORIDE 2 MG: 2 TABLET ORAL at 06:40

## 2019-10-03 RX ADMIN — GABAPENTIN 300 MG: 300 CAPSULE ORAL at 08:53

## 2019-10-03 RX ADMIN — AMLODIPINE BESYLATE 10 MG: 10 TABLET ORAL at 08:52

## 2019-10-03 RX ADMIN — CYANOCOBALAMIN TAB 1000 MCG 1000 MCG: 1000 TAB at 08:52

## 2019-10-03 RX ADMIN — SENNOSIDES AND DOCUSATE SODIUM 2 TABLET: 8.6; 5 TABLET ORAL at 08:52

## 2019-10-03 RX ADMIN — Medication 10 ML: at 06:37

## 2019-10-03 RX ADMIN — HYDROMORPHONE HYDROCHLORIDE 2 MG: 2 TABLET ORAL at 15:59

## 2019-10-03 NOTE — PROGRESS NOTES
Problem: Self Care Deficits Care Plan (Adult)  Goal: *Acute Goals and Plan of Care (Insert Text)  Description  GOALS:   DISCHARGE GOALS (in preparation for going home/rehab):  3 days  All goals met 10/3/2019  1. Ms. Sagar Henao will perform one lower body dressing activity with minimal assistance with adaptive equipment to demonstrate improved functional mobility and safety. 2.  Ms. Sagar Henao will perform one lower body bathing activity with minimal  assistance with adaptive equipment to demonstrate improved functional mobility and safety. 3.  Ms. Sagar Henao will perform toileting/toilet transfer with contact guard assistance with adaptive equipment to demonstrate improved functional mobility and safety. 4.  Ms. Keith Juliano will perform shower transfer with contact guard assistance with adaptive equipment to demonstrate improved functional mobility and safety. 5.  Ms. Sagar Henao will state AIDEN precautions with two verbal cues to demonstrate improved functional mobility and safety. Outcome: Progressing Towards Goal         JOINT CAMP OCCUPATIONAL THERAPY AIDEN: Daily Note, Discharge and AM 10/3/2019  INPATIENT: Hospital Day: 2  Payor: Matty Higuera / Plan: [employer] / Product Type: [employer] /      NAME/AGE/GENDER: Myrna Bearden is a 79 y.o. female   PRIMARY DIAGNOSIS:  Localized osteoarthrosis of right hip [M16.11]   Procedure(s) and Anesthesia Type:     * RIGHT HIP ARTHROPLASTY TOTAL / Marisol Lyn - Spinal (Right)  ICD-10: Treatment Diagnosis:    · Pain in Right Hip (M25.551)  · Stiffness of Right Hip, Not elsewhere classified (M25.651)  · Other lack of cordination (R27.8)      ASSESSMENT:     Ms. Sagar Henao is s/p R AIDEN and presents with decreased weight bearing on R LE and decreased independence with functional mobility and activities of daily living.   Patient completed shower and dressing as charted below in ADL grid and is ambulating with rolling walker and CGA assist.  Patient has met 5/5 goals and plans to return home with good family support. Family able to provide patient with appropriate level of assistance at this time. OT reviewed hip precautions throughout session. Patient instructed to call for assistance when needing to get up from recliner and all needs in reach. Patient verbalized understanding of call light. This section established at most recent assessment   PROBLEM LIST (Impairments causing functional limitations):  1. Decreased Strength  2. Decreased ADL/Functional Activities  3. Decreased Transfer Abilities  4. Increased Pain  5. Increased Fatigue  6. Decreased Flexibility/Joint Mobility  7. Decreased Knowledge of Precautions   INTERVENTIONS PLANNED: (Benefits and precautions of occupational therapy have been discussed with the patient.)  1. Activities of daily living training  2. Adaptive equipment training  3. Balance training  4. Clothing management  5. Donning&doffing training  6. Theraputic activity     TREATMENT PLAN: Frequency/Duration: Follow patient 1-2 times to address above goals. Rehabilitation Potential For Stated Goals: Good     RECOMMENDED REHABILITATION/EQUIPMENT: (at time of discharge pending progress): Continue Skilled Therapy. OCCUPATIONAL PROFILE AND HISTORY:   History of Present Injury/Illness (Reason for Referral): Pt presents this date s/p (right) AIDEN. Past Medical History/Comorbidities:   Ms. Nikita Almaraz  has a past medical history of Adverse effect of anesthesia, Arthritis, BMI 34.0-34.9,adult, Depression, Former smoker, Hypercholesterolemia, Hypertension, and Neuropathy. She also has no past medical history of Difficult intubation, Malignant hyperthermia due to anesthesia, Nausea & vomiting, or Pseudocholinesterase deficiency. Ms. Nikita Almaraz  has a past surgical history that includes hx partial hysterectomy (1990's) and hx colonoscopy.   Social History/Living Environment:   Home Environment: Private residence  # Steps to Enter: 0  One/Two Story Residence: One story  Living Alone: No  Support Systems: Child(gerry)  Patient Expects to be Discharged to[de-identified] Private residence  Current DME Used/Available at Home: hodan Douglas  Prior Level of Function/Work/Activity:  Mod I with ADLS     Number of Personal Factors/Comorbidities that affect the Plan of Care: Brief history (0):  LOW COMPLEXITY   ASSESSMENT OF OCCUPATIONAL PERFORMANCE[de-identified]   Most Recent Physical Functioning:   Balance  Sitting: Intact  Standing: With support                              Mental Status  Neurologic State: Alert  Orientation Level: Oriented X4  Cognition: Follows commands  Perception: Appears intact  Perseveration: No perseveration noted  Safety/Judgement: Fall prevention                Basic ADLs (From Assessment) Complex ADLs (From Assessment)   Basic ADL  Feeding: Supervision  Oral Facial Hygiene/Grooming: Supervision  Bathing: Moderate assistance  Type of Bath: Chlorhexidine (CHG), Full, Shower  Upper Body Dressing: Supervision  Lower Body Dressing: Moderate assistance  Toileting: Minimum assistance     Grooming/Bathing/Dressing Activities of Daily Living   Grooming  Grooming Assistance: Set-up  Washing Face: Set-up  Brushing Teeth: Set-up Cognitive Retraining  Safety/Judgement: Fall prevention   Upper Body Bathing  Bathing Assistance: Set-up  Position Performed: Seated in chair;Standing  Cues: Verbal cues provided  Adaptive Equipment: Grab bar;Long handled sponge; Shower chair Feeding  Feeding Assistance: Set-up   Lower Body Bathing  Bathing Assistance: Set-up  Perineal  : Set-up  Position Performed: Seated in chair;Standing  Cues: Verbal cues provided  Adaptive Equipment: Grab bar;Long handled sponge  Lower Body : Set-up  Position Performed: Seated in chair;Standing  Cues: Verbal cues provided  Adaptive Equipment: Grab bar;Long handled sponge; Shower chair Toileting  Toileting Assistance: Set-up   Upper Body Dressing Assistance  Dressing Assistance: Set-up  Bra: Set-up  Pullover Shirt: Set-up Functional Transfers  Bathroom Mobility: Contact guard assistance  Toilet Transfer : Contact guard assistance  Shower Transfer: Contact guard assistance  Adaptive Equipment: Bedside commode;Grab bars; Shower chair with back; Walker (comment)   Lower Body Dressing Assistance  Dressing Assistance: Set-up  Pants With Elastic Waist: Set-up  Socks: Compensatory technique training( socks)  Adaptive Equipment Used: Grab bar;Long handled shoe horn;Walker;Reacher Bed/Mat Mobility  Supine to Sit: Contact guard assistance  Sit to Supine: Contact guard assistance  Sit to Stand: Contact guard assistance  Stand to Sit: Contact guard assistance  Bed to Chair: Contact guard assistance         Physical Skills Involved:  1. Balance  2. Strength  3. Pain (acute) Cognitive Skills Affected (resulting in the inability to perform in a timely and safe manner):  1. WFL  Psychosocial Skills Affected:  1. none    Number of elements that affect the Plan of Care: 1-3:  LOW COMPLEXITY   CLINICAL DECISION MAKIN[address] [place] [address] AM-PAC 6 Clicks   Daily Activity Inpatient Short Form  How much help from another person does the patient currently need. .. Total A Lot A Little None   1. Putting on and taking off regular lower body clothing? ? 1    2 x  ? 3   ? 4   2. Bathing (including washing, rinsing, drying)? ? 1    2   ? 3 x  ? 4   3. Toileting, which includes using toilet, bedpan or urinal?   ? 1   ? 2    3  x ? 4   4. Putting on and taking off regular upper body clothing? ? 1   ? 2   ? 3   ? 4   5. Taking care of personal grooming such as brushing teeth? ? 1   ? 2   ? 3   ? 4   6. Eating meals? ? 1   ? 2   ? 3   ? 4   © 2007, Trustees of [address] [place] [id number], under license to [employer].  All rights reserved     Score:  Initial: 19 Most Recent: 23 (Date: 10-3-19 )    Interpretation of Tool:  Represents activities that are increasingly more difficult (i.e. Bed mobility, Transfers, Gait).    Medical Necessity:     · Patient is expected to demonstrate progress in self care skills  Reason for Services/Other Comments:  · Patient continues to require skilled intervention due to s/p R AIDEN   · . Use of outcome tool(s) and clinical judgement create a POC that gives a: LOW COMPLEXITY            TREATMENT:   (In addition to Assessment/Re-Assessment sessions the following treatments were rendered)     Pre-treatment Symptoms/Complaints: Tolerated shower  Pain: Initial:   Pain Intensity 1: 3  Pain Location 1: Hip  Pain Orientation 1: Right  Pain Intervention(s) 1: Repositioned  Post Session:  3/10 rest, icepack given     Self Care: (40): Procedure(s) (per grid) utilized to improve and/or restore self-care/home management as related to dressing, bathing, toileting and grooming. Required minimal verbal and   cueing to facilitate activities of daily living skills and compensatory activities. Treatment/Session Assessment:     Response to Treatment:  tolerated well, up in recliner. Education:  ? Home Exercises  ? Fall Precautions  ? Hip Precautions ? Going Home Video  ? Knee/Hip Prosthesis Review  ? Walker Management/Safety ? Adaptive Equipment as Needed       Interdisciplinary Collaboration:   o Occupational Therapist  o Registered Nurse    After treatment position/precautions:   o Up in chair  o Bed/Chair-wheels locked  o Caregiver at bedside  o Call light within reach  o RN notified  o LEs reclined     Compliance with Program/Exercises: Compliant all of the time. Recommendations/Intent for next treatment session: Pt doing well all goals met and will do well at home with support from family. Patient will be discharged home with home health PT. No further Occupational Therapy warranted, will discharge Occupational Therapy services.       Total Treatment Duration:  OT Patient Time In/Time Out  Time In: 0800  Time Out: 2907 Liliana Escamilla OT

## 2019-10-03 NOTE — DISCHARGE INSTRUCTIONS
[address] [facility]   Patient Discharge Instructions    Johnny Diggs / [id number] : 1950    Admitted 10/2/2019 Discharged: 10/3/2019     IF YOU HAVE ANY PROBLEMS ONCE YOU ARE AT HOME CALL THE FOLLOWING NUMBERS:   Main office number: [phone]    Take Home Medications     {Medication reconciliation information is now added to the patient's AVS automatically when it is printed. There is no need to use this SmartLink in discharge instructions. Highlight this text and delete it to clear this message}    · It is important that you take the medication exactly as they are prescribed. · Keep your medication in the bottles provided by the pharmacist and keep a list of the medication names, dosages, and times to be taken in your wallet. · Do not take other medications without consulting your doctor. What to do at [address] [place] [address] Ave your prehospital diet. If you have excessive nausea or vomitting call your doctor's office     Home Physical Therapy is arranged. Use rolling walker when walking. Patients who have had a joint replacement should not drive until you are seen for your follow up appointment by Dr. Rosa Ashraf. When to Call    - Call if you have a temperature greater then 101  - Unable to keep food down  - Loose control of your bladder or bowel function  - Are unable to bear any weight   - Need a pain medication refill     Information obtained by :  I understand that if any problems occur once I am at home I am to contact my physician. I understand and acknowledge receipt of the instructions indicated above.                                                                                                                                            Physician's or R.N.'s Signature                                                                  Date/Time Patient or Representative Signature                                                          Date/Time    Patient Education     DISCHARGE SUMMARY from Nurse    PATIENT INSTRUCTIONS:    After general anesthesia or intravenous sedation, for 24 hours or while taking prescription Narcotics:  · Limit your activities  · Do not drive and operate hazardous machinery  · Do not make important personal or business decisions  · Do  not drink alcoholic beverages  · If you have not urinated within 8 hours after discharge, please contact your surgeon on call. Report the following to your surgeon:  · Excessive pain, swelling, redness or odor of or around the surgical area  · Temperature over 100.5  · Nausea and vomiting lasting longer than 4 hours or if unable to take medications  · Any signs of decreased circulation or nerve impairment to extremity: change in color, persistent  numbness, tingling, coldness or increase pain  · Any questions    What to do at Home:  Recommended activity: {discharge activity:86193}, ***    If you experience any of the following symptoms ***, please follow up with ***. *  Please give a list of your current medications to your Primary Care Provider. *  Please update this list whenever your medications are discontinued, doses are      changed, or new medications (including over-the-counter products) are added. *  Please carry medication information at all times in case of emergency situations. These are general instructions for a healthy lifestyle:    No smoking/ No tobacco products/ Avoid exposure to second hand smoke  Surgeon General's Warning:  Quitting smoking now greatly reduces serious risk to your health.     Obesity, smoking, and sedentary lifestyle greatly increases your risk for illness    A healthy diet, regular physical exercise & weight monitoring are important for maintaining a healthy lifestyle    You may be retaining fluid if you have a history of heart failure or if you experience any of the following symptoms:  Weight gain of 3 pounds or more overnight or 5 pounds in a week, increased swelling in our hands or feet or shortness of breath while lying flat in bed. Please call your doctor as soon as you notice any of these symptoms; do not wait until your next office visit. The discharge information has been reviewed with the {PATIENT PARENT GUARDIAN:26250}. The {PATIENT PARENT GUARDIAN:86330} verbalized understanding. Discharge medications reviewed with the {Dishcarge meds reviewed PWDB:92122} and appropriate educational materials and side effects teaching were provided. ___________________________________________________________________________________________________________________________________     Hip Replacement Surgery (Posterior): What to Expect at Home  Your Recovery  Hip replacement surgery replaces the worn parts of your hip joint. When you leave the hospital, you will probably be walking with crutches or a walker. You may be able to climb a few stairs and get in and out of bed and chairs. But you will need someone to help you at home for the next few weeks or until you have more energy and can move around better. If you need more extensive rehab, you may go to a specialized rehab center for more treatment. You will go home with a bandage and stitches, staples, tissue glue, or tape strips. You can remove the bandage when your doctor tells you to. If you have stitches or staples, your doctor will remove them 10 days to 3 weeks after your surgery. Glue or tape strips will fall off on their own over time. You may still have some mild pain, and the area may be swollen for 3 to 4 months after surgery. Your doctor will give you medicine for the pain. You will continue the rehabilitation program (rehab) you started in the hospital. The better you do with your rehab exercises, the sooner you will get your strength and movement back.  Most people are able to return to work 4 weeks to 4 months after surgery. This care sheet gives you a general idea about how long it will take for you to recover. But each person recovers at a different pace. Follow the steps below to get better as quickly as possible. How can you care for yourself at home? Activity    · Your doctor may not want your affected leg to cross the center of your body toward the other leg. If so, your therapist may suggest these ideas:  ? Do not cross your legs. ? Be very careful as you get in or out of bed or a car, so your leg does not cross that imaginary line in the middle of your body.     · Rest when you feel tired. You may take a nap, but do not stay in bed all day.     · Work with your physical therapist to learn the best way to exercise. You will probably have to use crutches or a walker for at least 4 to 6 weeks.     · Your doctor may advise you to stay away from activities that put stress on the joint. This includes sports such as tennis, football, and jogging.     · Try not to sit for too long at one time. You will feel less stiff if you take a short walk about every hour. When you sit, use chairs with arms, and do not sit in low chairs.     · Do not bend over more than 90 degrees (like the angle in a letter \"L\").     · Sleep on your back with your legs slightly apart or on your side with a pillow between your knees for about 6 weeks or as your doctor tells you. Do not sleep on your stomach or affected leg.     · Ask your doctor when you can drive again.     · Most people are able to return to work 4 weeks to 4 months after surgery.     · Ask your doctor when it is okay for you to have sex. Diet    · By the time you leave the hospital, you will probably be eating your normal diet. If your stomach is upset, try bland, low-fat foods like plain rice, broiled chicken, toast, and yogurt.  Your doctor may recommend that you take iron and vitamin supplements.     · Drink plenty of fluids (unless your doctor tells you not to).   · Eat healthy foods, and watch your portion sizes. Try to stay at your ideal weight. Too much weight puts more stress on your new hip joint.     · You may notice that your bowel movements are not regular right after your surgery. This is common. Try to avoid constipation and straining with bowel movements. You may want to take a fiber supplement every day. If you have not had a bowel movement after a couple of days, ask your doctor about taking a mild laxative. Medicines    · Your doctor will tell you if and when you can restart your medicines. He or she will also give you instructions about taking any new medicines.     · If you take blood thinners, such as warfarin (Coumadin), clopidogrel (Plavix), or aspirin, be sure to talk to your doctor. He or she will tell you if and when to start taking those medicines again. Make sure that you understand exactly what your doctor wants you to do.     · Your doctor may give you a blood-thinning medicine to prevent blood clots. If you take a blood thinner, be sure you get instructions about how to take your medicine safely. Blood thinners can cause serious bleeding problems. This medicine could be in pill form or as a shot (injection). If a shot is necessary, your doctor will tell you how to do this.     · Be safe with medicines. Take pain medicines exactly as directed. ? If the doctor gave you a prescription medicine for pain, take it as prescribed. ? If you are not taking a prescription pain medicine, ask your doctor if you can take an over-the-counter medicine.     · If you think your pain medicine is making you sick to your stomach:  ? Take your medicine after meals (unless your doctor has told you not to). ? Ask your doctor for a different pain medicine.     · If your doctor prescribed antibiotics, take them as directed. Do not stop taking them just because you feel better. You need to take the full course of antibiotics.    Incision care    · If your doctor told you how to care for your cut (incision), follow your doctor's instructions. You will have a dressing over the cut. A dressing helps the incision heal and protects it. Your doctor will tell you how to take care of this.     · If you did not get instructions, follow this general advice:  ? If you have strips of tape on the cut the doctor made, leave the tape on for a week or until it falls off.  ? If you have stitches or staples, your doctor will tell you when to come back to have them removed. ? If you have skin adhesive on the cut, leave it on until it falls off. Skin adhesive is also called glue or liquid stitches. ? Change the bandage every day. ? Wash the area daily with warm water, and pat it dry. Don't use hydrogen peroxide or alcohol. They can slow healing. ? You may cover the area with a gauze bandage if it oozes fluid or rubs against clothing. ? You may shower 24 to 48 hours after surgery. Pat the incision dry. Don't swim or take a bath for the first 2 weeks, or until your doctor tells you it is okay. Exercise    · Your rehab program will include a number of exercises to do. Always do them as your therapist tells you. Ice and elevation    · For pain, put ice or a cold pack on the area for 10 to 20 minutes at a time. Put a thin cloth between the ice and your skin.     · Your ankle may swell for about 3 months. Prop up your ankle when you ice it or anytime you sit or lie down. Try to keep it above the level of your heart. This will help reduce swelling. Other instructions   Continue to wear your support stockings as your doctor says. These help to prevent blood clots. The length of time that you will have to wear them depends on your activity level and the amount of swelling you have. Most people wear these stockings for 4 to 6 weeks after surgery.   Preventing falls is also very important.  To prevent falls:    · Arrange furniture so that you will not trip on it.     · Get rid of throw rugs, and move electrical cords out of the way.     · Walk only in areas with plenty of light.     · Put grab bars in showers and bathtubs.     · Avoid icy or snowy sidewalks.     · Wear shoes with sturdy, flat soles. Follow-up care is a key part of your treatment and safety. Be sure to make and go to all appointments, and call your doctor if you are having problems. It's also a good idea to know your test results and keep a list of the medicines you take. When should you call for help? Call 911 anytime you think you may need emergency care. For example, call if:    · You passed out (lost consciousness).     · You have severe trouble breathing.     · You have sudden chest pain and shortness of breath, or you cough up blood.    Call your doctor now or seek immediate medical care if:    · You have signs that your hip may be dislocated, including:  ? Severe pain and not being able to stand. ? A crooked leg that looks like your hip is out of position. ? Not being able to bend or straighten your leg.     · Your leg or foot is cool or pale or changes color.     · You cannot feel or move your leg.     · You have signs of a blood clot, such as:  ? Pain in your calf, back of the knee, thigh, or groin. ? Redness and swelling in your leg or groin.     · Your incision comes open and begins to bleed, or the bleeding increases.     · You feel like your heart is racing or beating irregularly.     · You have signs of infection, such as:  ? Increased pain, swelling, warmth, or redness. ? Red streaks leading from the incision. ? Pus draining from the incision. ? A fever.    Watch closely for changes in your health, and be sure to contact your doctor if:    · You do not have a bowel movement after taking a laxative.     · You do not get better as expected. Where can you learn more? Go to http://vipul-elma.info/.   Enter P437 in the search box to learn more about \"Hip Replacement Surgery (Posterior): What to Expect at Home. \"  Current as of: June 26, 2019  Content Version: 12.2  © 4670-8431 [employer]. Care instructions adapted under license by [employer] (which disclaims liability or warranty for this information). If you have questions about a medical condition or this instruction, always ask your healthcare professional. [address] any warranty or liability for your use of this information.

## 2019-10-03 NOTE — PROGRESS NOTES
October 3, 2019         Post Op day: 1 Day Post-Op     Admit Date: 10/2/2019  Admit Diagnosis: Localized osteoarthrosis of right hip [M16.11]  Osteoarthritis of right hip [M16.11]        Subjective: Patient stable. No acute events. Objective:   Visit Vitals  /74 (BP 1 Location: Right arm, BP Patient Position: At rest)   Pulse 70   Temp 97.9 °F (36.6 °C)   Resp 18   Ht 5' 5\" (1.651 m)   Wt 93 kg (205 lb)   SpO2 98%   Breastfeeding? No   BMI 34.11 kg/m²    Temp (24hrs), Av °F (36.7 °C), Min:97.4 °F (36.3 °C), Max:98.9 °F (37.2 °C)    Lab Results   Component Value Date/Time    HGB 9.5 (L) 10/03/2019 04:48 AM     Extremity Exam  Dressing clean and dry   Tibialis Anterior and Gastroc-Soleus functioning normally right lower extremity  Sensation intact to light touch on operative limb  Extremity perfused  TEDS/SCDS in place  No sign of DVT     Assessment / Plan :  WBAT RLE  Posterior hip precautions  Continue PT/OT  Continue current DVT prophylaxis in house. Discharge on ASA BID  DIspo-HH  Patient Active Problem List   Diagnosis Code    Essential hypertension I10    Mixed hyperlipidemia E78.2    Chronic pain of right knee M25.561, G89.29    Mild episode of recurrent major depressive disorder ([facility] [address] F33.0    Neuropathy G62.9    Tobacco abuse Z72.0    Severe obesity (HCC) E66.01    Snoring R06.83    Osteoarthritis of right hip M16.11          Signed By: Lisandro Simpson MD     I have reviewed the patients controlled substance prescription history, as maintained in the [place] prescription monitoring program, so that the prescription(s) for a  controlled substance can be given.

## 2019-10-03 NOTE — PROGRESS NOTES
I have reviewed discharge instructions with the patient. The patient verbalized understanding. Multiple prescriptions given, including dilaudid. [facility] to follow.

## 2019-10-03 NOTE — PROGRESS NOTES
Problem: Mobility Impaired (Adult and Pediatric)  Goal: *Acute Goals and Plan of Care (Insert Text)  Description  GOALS (1-4 days):  (1.)Ms. Michael Stokes will move from supine to sit and sit to supine  in bed with STAND BY ASSIST.    (2.)Ms. Michael Stokes will transfer from bed to chair and chair to bed with STAND BY ASSIST using the least restrictive device. (3.)Ms. Gardner will ambulate with STAND BY ASSIST for 150 feet with the least restrictive device. (4.)Ms. Gardner will ambulate up/down 2 steps with bilateral  railing with MINIMAL ASSIST with no device. (5.)Ms. Gardner will state/observe AIDEN precautions with 0 verbal cues. ________________________________________________________________________________________________     Outcome: Progressing Towards Goal     PHYSICAL THERAPY JOINT CAMP AIDEN: Daily Note and AM 10/3/2019  INPATIENT: Hospital Day: 2  Payor: Pepe Pal / Plan: [employer] Reid / Product Type: [employer] Care Medicare /      NAME/AGE/GENDER: Byron Allen is a 79 y.o. female   PRIMARY DIAGNOSIS:  Localized osteoarthrosis of right hip [M16.11]   Procedure(s) and Anesthesia Type:     * RIGHT HIP ARTHROPLASTY TOTAL / Theresa Idler - Spinal (Right)  ICD-10: Treatment Diagnosis:    · Pain in Right Hip (M25.551)  · Stiffness of Right Hip, Not elsewhere classified (M25.651)  · Difficulty in walking, Not elsewhere classified (R26.2)      ASSESSMENT:     Ms. Michael Stokes presents up in chair on contact and agreeable to therapy. Worked on AIDEN exercises in the chair with verbal cues progressing with repetitions from yesterday. Then worked on gait training in the [place] with verbal cues for reciprocal gait pattern and walker use. Pt progressing with distance. Back in room pt stayed up in chair with ice by hip and needs in reach. Hope to progress at next therapy session. This section established at most recent assessment   PROBLEM LIST (Impairments causing functional limitations):  1.  Decreased Strength  2. Decreased ADL/Functional Activities  3. Decreased Transfer Abilities  4. Decreased Ambulation Ability/Technique  5. Decreased Balance  6. Increased Pain  7. Decreased Activity Tolerance  8. Decreased Flexibility/Joint Mobility  9. Decreased Knowledge of Precautions  10. Decreased [place] with Home Exercise Program  11. Decreased strength   INTERVENTIONS PLANNED: (Benefits and precautions of physical therapy have been discussed with the patient.)  1. bed mobility  2. gait training  3. home exercise program (HEP)  4. Range of Motion: active/assisted/passive  5. Therapeutic Activities  6. therapeutic exercise/strengthening  7. transfer training  8. Group Therapy     TREATMENT PLAN: Frequency/Duration: Follow patient BID for duration of hospital stay to address above goals. Rehabilitation Potential For Stated Goals: Good     RECOMMENDED REHABILITATION/EQUIPMENT: (at time of discharge pending progress): Continue Skilled Therapy and Home Health: Physical Therapy. HISTORY:   History of Present Injury/Illness (Reason for Referral):  S/p right claudia  Past Medical History/Comorbidities:   Ms. Priyanka Lees  has a past medical history of Adverse effect of anesthesia, Arthritis, BMI 34.0-34.9,adult, Depression, Former smoker, Hypercholesterolemia, Hypertension, and Neuropathy. She also has no past medical history of Difficult intubation, Malignant hyperthermia due to anesthesia, Nausea & vomiting, or Pseudocholinesterase deficiency. Ms. Priyanka Lees  has a past surgical history that includes hx partial hysterectomy (1990's) and hx colonoscopy.   Social History/Living Environment:   Home Environment: Private residence  # Steps to Enter: 0  One/Two Story Residence: One story  Living Alone: No  Support Systems: Child(gerry)  Patient Expects to be Discharged to[de-identified] Private residence  Current DME Used/Available at Home: Cane, straight  Prior Level of Function/Work/Activity:  Using quad cane with gait   Number of Personal Factors/Comorbidities that affect the Plan of Care: 1-2: MODERATE COMPLEXITY   EXAMINATION:   Most Recent Physical Functioning:                            Bed Mobility  Supine to Sit: Contact guard assistance  Sit to Supine: Contact guard assistance    Transfers  Sit to Stand: Stand-by assistance;Contact guard assistance  Stand to Sit: Stand-by assistance;Contact guard assistance  Bed to Chair: Contact guard assistance    Balance  Sitting: Intact  Standing: Intact; With support         Gait Training: Yes    Weight Bearing Status  Right Side Weight Bearing: As tolerated  Distance (ft): 125 Feet (ft)  Ambulation - Level of Assistance: Stand-by assistance;Contact guard assistance  Assistive Device: Walker, rolling  Speed/Nargis: Pace decreased (<100 feet/min)  Step Length: Left shortened  Stance: Right decreased  Gait Abnormalities: Antalgic  Interventions: Safety awareness training;Verbal cues     Braces/Orthotics:     Right Hip Cold  Type: Cold/ice packs      Body Structures Involved:  1. Bones  2. Joints  3. Muscles  4. Ligaments Body Functions Affected:  1. Movement Related Activities and Participation Affected:  1. Mobility   Number of elements that affect the Plan of Care: 3: MODERATE COMPLEXITY   CLINICAL PRESENTATION:   Presentation: Stable and uncomplicated: LOW COMPLEXITY   CLINICAL DECISION MAKIN[address] [place], [place] [address] 6 Clicks   Basic Mobility Inpatient Short Form  How much difficulty does the patient currently have. .. Unable A Lot A Little None   1. Turning over in bed (including adjusting bedclothes, sheets and blankets)? ? 1   ? 2   ? 3   ? 4   2. Sitting down on and standing up from a chair with arms ( e.g., wheelchair, bedside commode, etc.)   ? 1   ? 2   ? 3   ? 4   3. Moving from lying on back to sitting on the side of the bed?   ? 1   ? 2   ? 3   ? 4   How much help from another person does the patient currently need. .. Total A Lot A Little None   4.   Moving to and from a bed to a chair (including a wheelchair)? ? 1   ? 2   ? 3   ? 4   5. Need to walk in hospital room? ? 1   ? 2   ? 3   ? 4   6. Climbing 3-5 steps with a railing? ? 1   ? 2   ? 3   ? 4   © 2007, Trustees of [address] [place] [address] [address] [address], under license to [employer]. All rights reserved     Score:  Initial: 18 Most Recent: X (Date: -- )    Interpretation of Tool:  Represents activities that are increasingly more difficult (i.e. Bed mobility, Transfers, Gait). Medical Necessity:     · Patient is expected to demonstrate progress in strength, range of motion and balance  ·  to decrease assistance required with exercises and functional mobility   · . Reason for Services/Other Comments:  · Patient continues to require present interventions due to patient's inability to perform exercises and functional mobility independently   · . Use of outcome tool(s) and clinical judgement create a POC that gives a: Clear prediction of patient's progress: LOW COMPLEXITY            TREATMENT:   (In addition to Assessment/Re-Assessment sessions the following treatments were rendered)     Pre-treatment Symptoms/Complaints:  none  Pain Initial:     no reports of pain Post Session:  No reports of pain     Therapeutic Exercise: (13 Minutes):  Exercises per grid below to improve mobility and strength. Required minimal visual, verbal and manual cues to promote proper body alignment, promote proper body posture and promote proper body mechanics. Progressed range and repetitions as indicated. Gait Training (10 Minutes):  Gait training to improve and/or restore physical functioning as related to mobility and strength. Ambulated 125 Feet (ft) with Stand-by assistance;Contact guard assistance using a Walker, rolling and minimal Safety awareness training;Verbal cues related to their stance phase and stride length to promote proper body alignment and promote proper body posture.   Instruction in performance of walker use and gait sequencing to correct stance phase and stride length. Date:  10/2 Date:  10/3/19 Date:     ACTIVITY/EXERCISE AM PM AM PM AM PM   GROUP THERAPY  ?  ?  ?  ?  ?  ? Ankle Pumps  10a 15      Quad Sets  10a 15      Gluteal Sets  10a 15      Hip ABd/ADduction  10aa 15      Straight Leg Raises         Knee Slides  10aa 15      Short Arc Quads   15      Long Arc Quads   15      Chair Slides                  B = bilateral; AA = active assistive; A = active; P = passive      Treatment/Session Assessment:     Response to Treatment:  Pt. Did well. Education:  ? Home Exercises  ? Fall Precautions  ? Hip Precautions ? D/C Instruction Review  ? Hip Prosthesis Review  ? Walker Management/Safety ? Adaptive Equipment as Needed       Interdisciplinary Collaboration:   o Registered Nurse    After treatment position/precautions:   o Up in chair  o Bed/Chair-wheels locked  o Call light within reach  o Family at bedside    Compliance with Program/Exercises: Compliant all of the time, Will assess as treatment progresses. Recommendations/Intent for next treatment session:  Treatment next visit will focus on increasing Ms. Gardner's independence with bed mobility, transfers, gait training, strength/ROM exercises, modalities for pain, and patient education.       Total Treatment Duration:  PT Patient Time In/Time Out  Time In: 0914  Time Out: [address], PT

## 2019-10-03 NOTE — PROGRESS NOTES
Problem: Mobility Impaired (Adult and Pediatric)  Goal: *Acute Goals and Plan of Care (Insert Text)  Description  GOALS (1-4 days):  (1.)Ms. Sydnee Bradley will move from supine to sit and sit to supine  in bed with STAND BY ASSIST.    (2.)Ms. Sydnee Bradley will transfer from bed to chair and chair to bed with STAND BY ASSIST using the least restrictive device. (3.)Ms. Gardner will ambulate with STAND BY ASSIST for 150 feet with the least restrictive device. (4.)Ms. Gardner will ambulate up/down 2 steps with bilateral  railing with MINIMAL ASSIST with no device. (5.)Ms. Gardner will state/observe AIDEN precautions with 0 verbal cues. ________________________________________________________________________________________________     Outcome: Progressing Towards Goal     PHYSICAL THERAPY JOINT CAMP AIDEN: Daily Note and PM 10/3/2019  INPATIENT: Hospital Day: 2  Payor: Rosaura Miller / Plan: Arcadio Martinez / Product Type: [employer] Care Medicare /      NAME/AGE/GENDER: Tavo Lynne is a 79 y.o. female   PRIMARY DIAGNOSIS:  Localized osteoarthrosis of right hip [M16.11]   Procedure(s) and Anesthesia Type:     * RIGHT HIP ARTHROPLASTY TOTAL / Lorren Suhas - Spinal (Right)  ICD-10: Treatment Diagnosis:    · Pain in Right Hip (M25.551)  · Stiffness of Right Hip, Not elsewhere classified (M25.651)  · Difficulty in walking, Not elsewhere classified (R26.2)      ASSESSMENT:     Ms. Sydnee Bradley presents up in chair and ready for afternoon therapy. Worked on gait training in the [place] continuing to make progress with gait distance. In gym worked on [employer] exercises with verbal cues. Reviewed HEP and use of ice along with frequency. Pt insisted she did not have any stairs so she said she did not need to practice. Pt did walk back to her room and stayed up in chair with ice by hip and needs in reach.  HHPT for follow up on discharge from hospital.     This section established at most recent assessment   PROBLEM LIST (Impairments causing functional limitations):  1. Decreased Strength  2. Decreased ADL/Functional Activities  3. Decreased Transfer Abilities  4. Decreased Ambulation Ability/Technique  5. Decreased Balance  6. Increased Pain  7. Decreased Activity Tolerance  8. Decreased Flexibility/Joint Mobility  9. Decreased Knowledge of Precautions  10. Decreased [place] with Home Exercise Program  11. Decreased strength   INTERVENTIONS PLANNED: (Benefits and precautions of physical therapy have been discussed with the patient.)  1. bed mobility  2. gait training  3. home exercise program (HEP)  4. Range of Motion: active/assisted/passive  5. Therapeutic Activities  6. therapeutic exercise/strengthening  7. transfer training  8. Group Therapy     TREATMENT PLAN: Frequency/Duration: Follow patient BID for duration of hospital stay to address above goals. Rehabilitation Potential For Stated Goals: Good     RECOMMENDED REHABILITATION/EQUIPMENT: (at time of discharge pending progress): Continue Skilled Therapy and Home Health: Physical Therapy. HISTORY:   History of Present Injury/Illness (Reason for Referral):  S/p right claudia  Past Medical History/Comorbidities:   Ms. Shandra Carl  has a past medical history of Adverse effect of anesthesia, Arthritis, BMI 34.0-34.9,adult, Depression, Former smoker, Hypercholesterolemia, Hypertension, and Neuropathy. She also has no past medical history of Difficult intubation, Malignant hyperthermia due to anesthesia, Nausea & vomiting, or Pseudocholinesterase deficiency. Ms. Shandra Carl  has a past surgical history that includes hx partial hysterectomy (1990's) and hx colonoscopy.   Social History/Living Environment:   Home Environment: Private residence  # Steps to Enter: 0  One/Two Story Residence: One story  Living Alone: No  Support Systems: Child(gerry)  Patient Expects to be Discharged to[de-identified] Private residence  Current DME Used/Available at Home: Cane, straight  Prior Level of Function/Work/Activity:  Using quad cane with gait   Number of Personal Factors/Comorbidities that affect the Plan of Care: 1-2: MODERATE COMPLEXITY   EXAMINATION:   Most Recent Physical Functioning:                            Bed Mobility  Supine to Sit: Contact guard assistance  Sit to Supine: Contact guard assistance    Transfers  Sit to Stand: Stand-by assistance  Stand to Sit: Stand-by assistance  Bed to Chair: Contact guard assistance    Balance  Sitting: Intact  Standing: Intact; With support         Gait Training: Yes    Weight Bearing Status  Right Side Weight Bearing: As tolerated  Distance (ft): 132 Feet (ft)(and another 132 feet)  Ambulation - Level of Assistance: Stand-by assistance  Assistive Device: Walker, rolling  Speed/Nargis: Pace decreased (<100 feet/min)  Step Length: Left shortened  Stance: Right decreased  Gait Abnormalities: Antalgic  Interventions: Safety awareness training;Verbal cues     Braces/Orthotics:     Right Hip Cold  Type: Cold/ice packs      Body Structures Involved:  1. Bones  2. Joints  3. Muscles  4. Ligaments Body Functions Affected:  1. Movement Related Activities and Participation Affected:  1. Mobility   Number of elements that affect the Plan of Care: 3: MODERATE COMPLEXITY   CLINICAL PRESENTATION:   Presentation: Stable and uncomplicated: LOW COMPLEXITY   CLINICAL DECISION MAKIN [address] 6 Clicks   Basic Mobility Inpatient Short Form  How much difficulty does the patient currently have. .. Unable A Lot A Little None   1. Turning over in bed (including adjusting bedclothes, sheets and blankets)? ? 1   ? 2   ? 3   ? 4   2. Sitting down on and standing up from a chair with arms ( e.g., wheelchair, bedside commode, etc.)   ? 1   ? 2   ? 3   ? 4   3. Moving from lying on back to sitting on the side of the bed?   ? 1   ? 2   ? 3   ? 4   How much help from another person does the patient currently need. .. Total A Lot A Little None   4.   Moving to and from a bed to a chair (including a wheelchair)? ? 1   ? 2   ? 3   ? 4   5. Need to walk in hospital room? ? 1   ? 2   ? 3   ? 4   6. Climbing 3-5 steps with a railing? ? 1   ? 2   ? 3   ? 4   © 2007, Trustees of [address] [place] [address] [address] [address], under license to [employer]. All rights reserved     Score:  Initial: 18 Most Recent: X (Date: -- )    Interpretation of Tool:  Represents activities that are increasingly more difficult (i.e. Bed mobility, Transfers, Gait). Medical Necessity:     · Patient is expected to demonstrate progress in strength, range of motion and balance  ·  to decrease assistance required with exercises and functional mobility   · . Reason for Services/Other Comments:  · Patient continues to require present interventions due to patient's inability to perform exercises and functional mobility independently   · . Use of outcome tool(s) and clinical judgement create a POC that gives a: Clear prediction of patient's progress: LOW COMPLEXITY            TREATMENT:   (In addition to Assessment/Re-Assessment sessions the following treatments were rendered)     Pre-treatment Symptoms/Complaints:  none  Pain Initial:     no reports of pain Post Session:  No reports of pain     Therapeutic Exercise: (45 Minutes(group)):  Exercises per grid below to improve mobility and strength. Required minimal visual, verbal and manual cues to promote proper body alignment, promote proper body posture and promote proper body mechanics. Progressed range and repetitions as indicated. Gait Training (15 Minutes):  Gait training to improve and/or restore physical functioning as related to mobility and strength. Ambulated 132 Feet (ft)(and another 132 feet) with Stand-by assistance using a Walker, rolling and minimal Safety awareness training;Verbal cues related to their stance phase and stride length to promote proper body alignment and promote proper body posture.   Instruction in performance of walker use and gait sequencing to correct stance phase and stride length. Date:  10/2 Date:  10/3/19 Date:     ACTIVITY/EXERCISE AM PM AM PM AM PM   GROUP THERAPY  ? ?  ?  X  ?  ? Ankle Pumps  10a 15 15     Quad Sets  10a 15 15     Gluteal Sets  10a 15 15     Hip ABd/ADduction  10aa 15 15     Straight Leg Raises         Knee Slides  10aa 15 15     Short Arc Quads   15 15     Long Arc Quads   15 15     Chair Slides                  B = bilateral; AA = active assistive; A = active; P = passive      Treatment/Session Assessment:     Response to Treatment:  Pt. Did well, making nice progress    Education:  ? Home Exercises  ? Fall Precautions  ? Hip Precautions X D/C Instruction Review  ? Hip Prosthesis Review  ? Walker Management/Safety ? Adaptive Equipment as Needed       Interdisciplinary Collaboration:   o Registered Nurse  o Rehabilitation Attendant    After treatment position/precautions:   o Up in chair  o Bed/Chair-wheels locked  o Call light within reach    Compliance with Program/Exercises: Compliant all of the time. Recommendations/Intent for next treatment session:  Treatment next visit will focus on increasing Ms. Gardner's independence with bed mobility, transfers, gait training, strength/ROM exercises, modalities for pain, and patient education.       Total Treatment Duration:  PT Patient Time In/Time Out  Time In: 1300  Time Out: [address], PT

## 2019-10-04 ENCOUNTER — PATIENT OUTREACH (OUTPATIENT)
Dept: CASE MANAGEMENT | Age: 69
End: 2019-10-04

## 2019-10-04 ENCOUNTER — HOME CARE VISIT (OUTPATIENT)
Dept: SCHEDULING | Facility: HOME HEALTH | Age: 69
End: 2019-10-04
Payer: MEDICARE

## 2019-10-04 VITALS
HEART RATE: 84 BPM | RESPIRATION RATE: 20 BRPM | DIASTOLIC BLOOD PRESSURE: 70 MMHG | SYSTOLIC BLOOD PRESSURE: 118 MMHG | TEMPERATURE: 98.4 F

## 2019-10-04 PROCEDURE — 400013 HH SOC

## 2019-10-04 PROCEDURE — G0151 HHCP-SERV OF PT,EA 15 MIN: HCPCS

## 2019-10-04 NOTE — PROGRESS NOTES
This note will not be viewable in [address] 1st Attempt to contact patient for [facility] call, no answer on mobile number;unable to L/M on V/M mailbox is full. . Will attempt to contact patient again within 24 hours

## 2019-10-07 ENCOUNTER — HOME CARE VISIT (OUTPATIENT)
Dept: SCHEDULING | Facility: HOME HEALTH | Age: 69
End: 2019-10-07
Payer: MEDICARE

## 2019-10-07 VITALS
SYSTOLIC BLOOD PRESSURE: 120 MMHG | RESPIRATION RATE: 16 BRPM | HEART RATE: 85 BPM | TEMPERATURE: 97.1 F | DIASTOLIC BLOOD PRESSURE: 80 MMHG

## 2019-10-07 PROCEDURE — G0157 HHC PT ASSISTANT EA 15: HCPCS

## 2019-10-09 ENCOUNTER — HOME CARE VISIT (OUTPATIENT)
Dept: SCHEDULING | Facility: HOME HEALTH | Age: 69
End: 2019-10-09
Payer: MEDICARE

## 2019-10-09 VITALS
DIASTOLIC BLOOD PRESSURE: 72 MMHG | TEMPERATURE: 97.7 F | SYSTOLIC BLOOD PRESSURE: 118 MMHG | RESPIRATION RATE: 17 BRPM | HEART RATE: 93 BPM

## 2019-10-09 PROCEDURE — G0157 HHC PT ASSISTANT EA 15: HCPCS

## 2019-10-11 ENCOUNTER — HOME CARE VISIT (OUTPATIENT)
Dept: SCHEDULING | Facility: HOME HEALTH | Age: 69
End: 2019-10-11
Payer: MEDICARE

## 2019-10-11 VITALS
DIASTOLIC BLOOD PRESSURE: 78 MMHG | HEART RATE: 91 BPM | SYSTOLIC BLOOD PRESSURE: 118 MMHG | TEMPERATURE: 97.3 F | RESPIRATION RATE: 16 BRPM

## 2019-10-11 PROCEDURE — G0157 HHC PT ASSISTANT EA 15: HCPCS

## 2019-10-14 ENCOUNTER — HOME CARE VISIT (OUTPATIENT)
Dept: SCHEDULING | Facility: HOME HEALTH | Age: 69
End: 2019-10-14
Payer: MEDICARE

## 2019-10-14 VITALS
DIASTOLIC BLOOD PRESSURE: 70 MMHG | SYSTOLIC BLOOD PRESSURE: 124 MMHG | RESPIRATION RATE: 16 BRPM | TEMPERATURE: 97.5 F | HEART RATE: 93 BPM

## 2019-10-14 PROCEDURE — G0157 HHC PT ASSISTANT EA 15: HCPCS

## 2019-10-15 ENCOUNTER — PATIENT OUTREACH (OUTPATIENT)
Dept: CASE MANAGEMENT | Age: 69
End: 2019-10-15

## 2019-10-17 ENCOUNTER — HOME CARE VISIT (OUTPATIENT)
Dept: SCHEDULING | Facility: HOME HEALTH | Age: 69
End: 2019-10-17
Payer: MEDICARE

## 2019-10-17 VITALS
SYSTOLIC BLOOD PRESSURE: 118 MMHG | TEMPERATURE: 97.4 F | RESPIRATION RATE: 16 BRPM | DIASTOLIC BLOOD PRESSURE: 80 MMHG | HEART RATE: 96 BPM

## 2019-10-17 PROCEDURE — G0157 HHC PT ASSISTANT EA 15: HCPCS

## 2019-10-22 ENCOUNTER — HOME CARE VISIT (OUTPATIENT)
Dept: SCHEDULING | Facility: HOME HEALTH | Age: 69
End: 2019-10-22
Payer: MEDICARE

## 2019-10-22 VITALS
RESPIRATION RATE: 18 BRPM | TEMPERATURE: 97.8 F | HEART RATE: 92 BPM | SYSTOLIC BLOOD PRESSURE: 160 MMHG | DIASTOLIC BLOOD PRESSURE: 82 MMHG

## 2019-10-22 PROCEDURE — G0151 HHCP-SERV OF PT,EA 15 MIN: HCPCS

## 2019-10-23 ENCOUNTER — PATIENT OUTREACH (OUTPATIENT)
Dept: CASE MANAGEMENT | Age: 69
End: 2019-10-23

## 2019-10-23 NOTE — PROGRESS NOTES
This note will not be viewable in [address] 3rd attempt to contact patient for [facility] Call. No answer, on mobile number; unable to leave a message . Mailbox has not been set up. Episode will be closed at this time as [phone] has been unsuccessful in reaching the patient.

## 2019-10-25 ENCOUNTER — HOME CARE VISIT (OUTPATIENT)
Dept: SCHEDULING | Facility: HOME HEALTH | Age: 69
End: 2019-10-25
Payer: MEDICARE

## 2019-10-25 VITALS
RESPIRATION RATE: 18 BRPM | SYSTOLIC BLOOD PRESSURE: 140 MMHG | HEART RATE: 102 BPM | DIASTOLIC BLOOD PRESSURE: 80 MMHG | TEMPERATURE: 98.4 F

## 2019-10-25 PROCEDURE — G0151 HHCP-SERV OF PT,EA 15 MIN: HCPCS

## 2019-10-29 ENCOUNTER — HOME CARE VISIT (OUTPATIENT)
Dept: SCHEDULING | Facility: HOME HEALTH | Age: 69
End: 2019-10-29
Payer: MEDICARE

## 2019-10-29 PROCEDURE — G0151 HHCP-SERV OF PT,EA 15 MIN: HCPCS

## 2019-10-30 VITALS
SYSTOLIC BLOOD PRESSURE: 130 MMHG | DIASTOLIC BLOOD PRESSURE: 80 MMHG | RESPIRATION RATE: 18 BRPM | TEMPERATURE: 97.8 F | HEART RATE: 100 BPM

## 2020-08-25 PROBLEM — M17.11 PRIMARY OSTEOARTHRITIS OF RIGHT KNEE: Status: ACTIVE | Noted: 2018-05-21

## 2020-08-25 PROBLEM — F32.A DEPRESSION: Status: ACTIVE | Noted: 2020-08-25

## 2020-08-25 PROBLEM — M70.50 PES ANSERINE BURSITIS: Status: ACTIVE | Noted: 2017-05-15

## 2020-08-25 PROBLEM — M76.31 ILIOTIBIAL BAND SYNDROME OF RIGHT SIDE: Status: ACTIVE | Noted: 2017-05-15

## 2020-09-06 PROBLEM — R79.89 LOW VITAMIN D LEVEL: Status: ACTIVE | Noted: 2020-09-06

## 2020-09-06 PROBLEM — E11.9 DIABETES MELLITUS TYPE 2, DIET-CONTROLLED (HCC): Status: ACTIVE | Noted: 2020-09-06

## 2021-01-27 ENCOUNTER — HOSPITAL ENCOUNTER (OUTPATIENT)
Dept: MAMMOGRAPHY | Age: 71
Discharge: HOME | End: 2021-01-27
Attending: FAMILY MEDICINE
Payer: MEDICARE

## 2021-01-27 DIAGNOSIS — Z12.31 ENCOUNTER FOR SCREENING MAMMOGRAM FOR MALIGNANT NEOPLASM OF BREAST: ICD-10-CM

## 2021-01-27 PROCEDURE — 77067 SCR MAMMO BI INCL CAD: CPT

## 2021-09-16 PROBLEM — E11.40 TYPE 2 DIABETES MELLITUS WITH DIABETIC NEUROPATHY (HCC): Status: ACTIVE | Noted: 2021-09-16

## 2021-10-27 ENCOUNTER — APPOINTMENT (OUTPATIENT)
Dept: CT IMAGING | Age: 71
End: 2021-10-27
Attending: PHYSICIAN ASSISTANT
Payer: MEDICARE

## 2021-10-27 ENCOUNTER — APPOINTMENT (OUTPATIENT)
Dept: GENERAL RADIOLOGY | Age: 71
End: 2021-10-27
Attending: PHYSICIAN ASSISTANT
Payer: MEDICARE

## 2021-10-27 ENCOUNTER — HOSPITAL ENCOUNTER (EMERGENCY)
Age: 71
Discharge: HOME | End: 2021-10-27
Attending: EMERGENCY MEDICINE
Payer: MEDICARE

## 2021-10-27 VITALS
TEMPERATURE: 98.2 F | WEIGHT: 222 LBS | RESPIRATION RATE: 16 BRPM | SYSTOLIC BLOOD PRESSURE: 128 MMHG | HEART RATE: 88 BPM | BODY MASS INDEX: 35.68 KG/M2 | OXYGEN SATURATION: 97 % | DIASTOLIC BLOOD PRESSURE: 92 MMHG | HEIGHT: 66 IN

## 2021-10-27 DIAGNOSIS — G89.29 CHRONIC PAIN OF RIGHT KNEE: ICD-10-CM

## 2021-10-27 DIAGNOSIS — M54.41 ACUTE BILATERAL LOW BACK PAIN WITH RIGHT-SIDED SCIATICA: ICD-10-CM

## 2021-10-27 DIAGNOSIS — M47.819 DEGENERATIVE ARTHROPATHY OF SPINAL FACET JOINT: ICD-10-CM
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
## 2021-10-27 DIAGNOSIS — M54.2 NECK PAIN: ICD-10-CM

## 2021-10-27 DIAGNOSIS — M25.561 CHRONIC PAIN OF RIGHT KNEE: ICD-10-CM

## 2021-10-27 PROCEDURE — 99283 EMERGENCY DEPT VISIT LOW MDM: CPT

## 2021-10-27 PROCEDURE — 72100 X-RAY EXAM L-S SPINE 2/3 VWS: CPT

## 2021-10-27 PROCEDURE — 72125 CT NECK SPINE W/O DYE: CPT

## 2021-10-27 RX ORDER — LIDOCAINE 50 MG/G
PATCH TOPICAL
Qty: 5 EACH | Refills: 0 | Status: SHIPPED | OUTPATIENT
Start: 2021-10-27 | End: 2022-03-31 | Stop reason: SDUPTHER

## 2021-10-27 RX ORDER — PREDNISONE 20 MG/1
40 TABLET ORAL DAILY
Qty: 10 TABLET | Refills: 0 | Status: SHIPPED | OUTPATIENT
Start: 2021-10-27 | End: 2021-11-01
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
## 2021-10-27 NOTE — ED NOTES
I have reviewed discharge instructions with the patient. The patient verbalized understanding. Patient left ED via Discharge Method: ambulatory to Home with self. Opportunity for questions and clarification provided. Patient given 2 scripts. To continue your aftercare when you leave the hospital, you may receive an automated call from our care team to check in on how you are doing. This is a free service and part of our promise to provide the best care and service to meet your aftercare needs.  If you have questions, or wish to unsubscribe from this service please call [phone]. Thank you for Choosing our [address] [place] [address] Emergency Department.
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
## 2021-10-27 NOTE — DISCHARGE INSTRUCTIONS
You can continue the use of your daily muscle relaxant and Ibuprofen every 6-8 hours for pain. You may also take Tylenol every 4-6 hours for pain. Apply Lidoderm patches to the areas for symptom relief as well as ice packs and heat. You may also take the prednisone tablets daily for pain and inflammation. Please return for emergent or severely worsening symptoms and follow-up closely with the primary doctor. If symptoms persist you may require further imaging or physical therapy evaluation.
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
## 2022-03-18 PROBLEM — M76.31 ILIOTIBIAL BAND SYNDROME OF RIGHT SIDE: Status: ACTIVE | Noted: 2017-05-15

## 2022-03-18 PROBLEM — E78.2 MIXED HYPERLIPIDEMIA: Status: ACTIVE | Noted: 2018-06-28

## 2022-03-18 PROBLEM — F32.A DEPRESSION: Status: ACTIVE | Noted: 2020-08-25

## 2022-03-18 PROBLEM — M70.50 PES ANSERINE BURSITIS: Status: ACTIVE | Noted: 2017-05-15

## 2022-03-19 PROBLEM — R79.89 LOW VITAMIN D LEVEL: Status: ACTIVE | Noted: 2020-09-06

## 2022-03-19 PROBLEM — M16.11 OSTEOARTHRITIS OF RIGHT HIP: Status: ACTIVE | Noted: 2019-10-02

## 2022-03-19 PROBLEM — M54.2 NECK PAIN: Status: ACTIVE | Noted: 2021-10-27

## 2022-03-19 PROBLEM — E11.9 DIABETES MELLITUS TYPE 2, DIET-CONTROLLED (HCC): Status: ACTIVE | Noted: 2020-09-06

## 2022-03-19 PROBLEM — E66.01 SEVERE OBESITY (HCC): Status: ACTIVE | Noted: 2019-02-28

## 2022-03-19 PROBLEM — F33.0 MILD EPISODE OF RECURRENT MAJOR DEPRESSIVE DISORDER (HCC): Status: ACTIVE | Noted: 2018-06-28

## 2022-03-19 PROBLEM — M54.41 ACUTE BILATERAL LOW BACK PAIN WITH RIGHT-SIDED SCIATICA: Status: ACTIVE | Noted: 2021-10-27

## 2022-03-19 PROBLEM — R06.83 SNORING: Status: ACTIVE | Noted: 2019-09-17

## 2022-03-19 PROBLEM — M47.819 DEGENERATIVE ARTHROPATHY OF SPINAL FACET JOINT: Status: ACTIVE | Noted: 2021-10-27

## 2022-03-19 PROBLEM — M17.11 PRIMARY OSTEOARTHRITIS OF RIGHT KNEE: Status: ACTIVE | Noted: 2018-05-21

## 2022-03-19 PROBLEM — Z72.0 TOBACCO ABUSE: Status: ACTIVE | Noted: 2018-06-28

## 2022-03-19 PROBLEM — E11.40 TYPE 2 DIABETES MELLITUS WITH DIABETIC NEUROPATHY (HCC): Status: ACTIVE | Noted: 2021-09-16

## 2022-03-19 PROBLEM — I10 ESSENTIAL HYPERTENSION: Status: ACTIVE | Noted: 2018-06-28

## 2022-03-20 PROBLEM — M25.561 CHRONIC PAIN OF RIGHT KNEE: Status: ACTIVE | Noted: 2018-06-28

## 2022-03-20 PROBLEM — G89.29 CHRONIC PAIN OF RIGHT KNEE: Status: ACTIVE | Noted: 2018-06-28

## 2022-03-20 PROBLEM — G62.9 NEUROPATHY: Status: ACTIVE | Noted: 2018-06-28

## 2022-03-31 DIAGNOSIS — Z12.11 SCREEN FOR COLON CANCER: Primary | ICD-10-CM

## 2022-03-31 PROBLEM — Z79.899 ENCOUNTER FOR LONG-TERM (CURRENT) USE OF MEDICATIONS: Status: ACTIVE | Noted: 2022-03-31

## 2022-03-31 PROBLEM — G89.29 CHRONIC PAIN OF LEFT KNEE: Status: ACTIVE | Noted: 2022-03-31

## 2022-03-31 PROBLEM — F11.99 OPIOID USE, UNSPECIFIED WITH UNSPECIFIED OPIOID-INDUCED DISORDER (HCC): Status: ACTIVE | Noted: 2022-03-31

## 2022-03-31 PROBLEM — M25.562 CHRONIC PAIN OF LEFT KNEE: Status: ACTIVE | Noted: 2022-03-31

## 2022-03-31 PROBLEM — G44.229 CHRONIC TENSION-TYPE HEADACHE, NOT INTRACTABLE: Status: ACTIVE | Noted: 2022-03-31

## 2022-03-31 PROBLEM — E55.9 VITAMIN D DEFICIENCY: Status: ACTIVE | Noted: 2022-03-31

## 2022-04-03 PROBLEM — F11.99 OPIOID USE, UNSPECIFIED WITH UNSPECIFIED OPIOID-INDUCED DISORDER (HCC): Status: RESOLVED | Noted: 2022-03-31 | Resolved: 2022-04-03

## 2022-06-07 ENCOUNTER — TELEMEDICINE (OUTPATIENT)
Dept: PRIMARY CARE CLINIC | Facility: CLINIC | Age: 72
End: 2022-06-07
Payer: MEDICARE

## 2022-06-07 DIAGNOSIS — Z79.899 ENCOUNTER FOR LONG-TERM (CURRENT) USE OF MEDICATIONS: ICD-10-CM

## 2022-06-07 DIAGNOSIS — E66.9 OBESITY, CLASS I, BMI 30.0-34.9 (SEE ACTUAL BMI): ICD-10-CM

## 2022-06-07 DIAGNOSIS — Z12.11 SCREEN FOR COLON CANCER: ICD-10-CM

## 2022-06-07 DIAGNOSIS — E11.9 DIABETES MELLITUS TYPE 2, DIET-CONTROLLED (HCC): Primary | ICD-10-CM

## 2022-06-07 DIAGNOSIS — Z12.31 ENCOUNTER FOR SCREENING MAMMOGRAM FOR MALIGNANT NEOPLASM OF BREAST: ICD-10-CM

## 2022-06-07 PROCEDURE — 3046F HEMOGLOBIN A1C LEVEL >9.0%: CPT | Performed by: FAMILY MEDICINE

## 2022-06-07 PROCEDURE — 99212 OFFICE O/P EST SF 10 MIN: CPT | Performed by: FAMILY MEDICINE

## 2022-06-07 PROCEDURE — 1123F ACP DISCUSS/DSCN MKR DOCD: CPT | Performed by: FAMILY MEDICINE

## 2022-06-07 ASSESSMENT — PATIENT HEALTH QUESTIONNAIRE - PHQ9
5. POOR APPETITE OR OVEREATING: 0
SUM OF ALL RESPONSES TO PHQ QUESTIONS 1-9: 0
7. TROUBLE CONCENTRATING ON THINGS, SUCH AS READING THE NEWSPAPER OR WATCHING TELEVISION: 0
SUM OF ALL RESPONSES TO PHQ QUESTIONS 1-9: 0
6. FEELING BAD ABOUT YOURSELF - OR THAT YOU ARE A FAILURE OR HAVE LET YOURSELF OR YOUR FAMILY DOWN: 0
9. THOUGHTS THAT YOU WOULD BE BETTER OFF DEAD, OR OF HURTING YOURSELF: 0
3. TROUBLE FALLING OR STAYING ASLEEP: 0
10. IF YOU CHECKED OFF ANY PROBLEMS, HOW DIFFICULT HAVE THESE PROBLEMS MADE IT FOR YOU TO DO YOUR WORK, TAKE CARE OF THINGS AT HOME, OR GET ALONG WITH OTHER PEOPLE: 0
SUM OF ALL RESPONSES TO PHQ QUESTIONS 1-9: 0
4. FEELING TIRED OR HAVING LITTLE ENERGY: 0
SUM OF ALL RESPONSES TO PHQ QUESTIONS 1-9: 0
2. FEELING DOWN, DEPRESSED OR HOPELESS: 0
8. MOVING OR SPEAKING SO SLOWLY THAT OTHER PEOPLE COULD HAVE NOTICED. OR THE OPPOSITE, BEING SO FIGETY OR RESTLESS THAT YOU HAVE BEEN MOVING AROUND A LOT MORE THAN USUAL: 0

## 2022-06-07 NOTE — PROGRESS NOTES
Azalia Mccann M.D.  [address] [facility]  Phone:  [phone]  Fax:  [phone]          I was in the office while conducting this encounter. The patient was at home. CHIEF COMPLAINT:  Chief Complaint   Patient presents with    Diabetes     She admits that she is not following a diabetic diet. She eats a lot of sweets. Her last A1c was 9.1.  Breast Pain     She has a tender lesion near her left breast.     Routine Mammography Outreach     She is due for a screening mammogram.     Colonoscopy     She is due for a colonoscopy. HISTORY OF PRESENT ILLNESS:  Ms. Racheal Lynch is a 73 y.o. female  who presents for follow up. Patient admits that she does not follow a diabetic diet. She does eat a lot of sweets. Her last A1c was 9.1. She has a tender lesion on her chest near her breast.  It has been there for a while. She would like to come in to have it evaluated. She is due for her screening mammogram.    She is also due for her screening colonoscopy. No other complaints. Taking medications as prescribed. Medications reviewed and updated. HISTORY:  No Known Allergies      REVIEW OF SYSTEMS:  Review of systems is as indicated in HPI otherwise negative. PHYSICAL EXAM:    Vital Signs: (As obtained by patient/caregiver at home)  No flowsheet data found.        Constitutional: [x] Appears well-developed and well-nourished [x] No apparent distress      [] Abnormal -     Mental status: [x] Alert and awake  [x] Oriented to person/place/time [x] Able to follow commands    [] Abnormal -     Eyes:   EOM    [x]  Normal    [] Abnormal -   Sclera  [x]  Normal    [] Abnormal -          Discharge [x]  None visible   [] Abnormal -     HENT: [x] Normocephalic, atraumatic  [] Abnormal -   [x] Mouth/Throat: Mucous membranes are moist    External Ears [x] Normal  [] Abnormal -    Neck: [x] No visualized mass [] Abnormal -     Pulmonary/Chest: [x] Respiratory effort normal   [x] No visualized signs of difficulty breathing or respiratory distress             Musculoskeletal:   [x] Normal gait with no signs of ataxia         [x] Normal range of motion of neck        [] Abnormal -     Neurological:        [x] No Facial Asymmetry (Cranial nerve 7 motor function) (limited exam due to video visit)          [x] No gaze palsy        [] Abnormal -          Skin:        [x] No significant exanthematous lesions or discoloration noted on facial skin         [] Abnormal -            Psychiatric:       [x] Normal Affect [] Abnormal -        [x] No Hallucinations    PHQ:  PHQ-9 Total Score: 0 (6/14/2022  1:27 PM)      LABS    Office Visit on 05/11/2022   Component Date Value Ref Range Status    Atopobium Vaginae 05/11/2022 Low - 0  Score Final    Bacterial Vaginosis Associated Allyssa* 05/11/2022 Low - 0  Score Final    MEGASHAERA 05/11/2022 Low - 0  Score Final    Comment: Calculate total score by adding the 3 individual bacterial  vaginosis (BV) marker scores together. Total score is  interpreted as follows: Total score 0-1: Indicates the absence of BV. Total score   2: Indeterminate for BV. Additional clinical                   data should be evaluated to establish a                   diagnosis. Total score 3-6: Indicates the presence of BV. This test was developed and its performance characteristics  determined by [facility].  It has not been cleared or approved  by the Food and Drug Administration.  Grace Albicans, JAMAL 05/11/2022 Negative  Negative Final    Candida glabrata by PCR 05/11/2022 Positive* Negative Final    Comment: Published data demonstrate that up to 65% of Candida glabrata  identified in cases of vaginal candidiasis have decreased  susceptibility to fluconazole.       Trichomonas Vaginalis by JAMAL 05/11/2022 Negative  Negative Final    Chlamydia trachomatis, JAMAL 05/11/2022 Negative  Negative Final    NEISSERIA GONORRHOEAE, JAMAL 05/11/2022 Negative  Negative Final       IMPRESSION/PLAN     Diagnosis Orders   1. Diabetes mellitus type 2, diet-controlled ([facility] [address]  [address] [facility]   2. Obesity, Class I, BMI 30.0-34.9 (see actual BMI)  [facility]   3. Screen for colon cancer  [facility] [place] [facility] Colonoscopy   4. Encounter for screening mammogram for malignant neoplasm of breast  T[facility] DIGITAL SCREEN W OR WO CAD BILATERAL   5. Encounter for long-term (current) use of medications         Follow up and Dispositions:  Return in about 1 week (around 6/14/2022). Patient will continue current medications. Reviewed medications and side effects in detail. Reviewed most recent labs. Reviewed diet, exercise and weight control. Cardiovascular risks and recommendations reviewed. Patient encouraged to follow a diabetic/low sodium diet. Patient will check blood sugars once daily. Will refer to the nutritionist.  Will refer for a colonoscopy. Will order her screening mammogram.      Consent: This patient and/or their healthcare decision maker is aware that this patient-initiated Telehealth encounter is a billable service, with coverage as determined by their insurance carrier. Patient is aware that they may receive a bill and has provided verbal consent to proceed: Yes     The patient is being evaluated by a Virtual Visit (video visit) encounter to address concerns as mentioned above. A caregiver was present when appropriate. Due to this being a TeleHealth encounter (During AUF- public health emergency), evaluation of the following organ systems was limited: Vitals/Constitutional/EENT/Resp/CV/GI//MS/Neuro/Skin/Heme-Lymph-Imm.   Pursuant to the emergency declaration under the [address] [facility], [address] waiver authority and the [employer] Act, this Virtual Visit was conducted with patient's (and/or legal guardian's) consent, to reduce the patient's risk of exposure to COVID-19 and provide necessary medical care. The patient (and/or legal guardian) has also been advised to contact this office for worsening conditions or problems, and seek emergency medical treatment and/or call 911 if deemed necessary. Patient identification was verified at the start of the visit: YES    Services were provided through a video synchronous discussion virtually to substitute for in-person clinic visit. Patient and provider were located at their individual homes. Jeff Luis, was evaluated through a synchronous (real-time) audio-video encounter. The patient (or guardian if applicable) is aware that this is a billable service, which includes applicable co-pays. This Virtual Visit was conducted with patient's (and/or legal guardian's) consent. The visit was conducted pursuant to the emergency declaration under the [facility], [address] [place] [address] [facility] authority and the [employer] General Act. Patient identification was verified, and a caregiver was present when appropriate. The patient was located at Home: [facility] [address] . Provider was located at [facility] ([address] [place] [address]): [address] Total Time: minutes: 11-20 minutes. Dictated using voice recognition software.  Proofread, but unrecognized voice recognition errors may exist.    James Hudson MD  06/15/22

## 2022-06-14 ENCOUNTER — OFFICE VISIT (OUTPATIENT)
Dept: PRIMARY CARE CLINIC | Facility: CLINIC | Age: 72
End: 2022-06-14
Payer: MEDICARE

## 2022-06-14 VITALS
WEIGHT: 202.2 LBS | HEIGHT: 66 IN | SYSTOLIC BLOOD PRESSURE: 123 MMHG | TEMPERATURE: 97.8 F | HEART RATE: 66 BPM | RESPIRATION RATE: 14 BRPM | DIASTOLIC BLOOD PRESSURE: 77 MMHG | BODY MASS INDEX: 32.5 KG/M2 | OXYGEN SATURATION: 96 %

## 2022-06-14 DIAGNOSIS — L72.3 SEBACEOUS CYST: Primary | ICD-10-CM

## 2022-06-14 DIAGNOSIS — Z91.81 AT HIGH RISK FOR FALLS: ICD-10-CM

## 2022-06-14 PROCEDURE — 1123F ACP DISCUSS/DSCN MKR DOCD: CPT | Performed by: FAMILY MEDICINE

## 2022-06-14 PROCEDURE — 99213 OFFICE O/P EST LOW 20 MIN: CPT | Performed by: FAMILY MEDICINE

## 2022-06-14 RX ORDER — CEPHALEXIN 500 MG/1
500 CAPSULE ORAL 3 TIMES DAILY
Qty: 30 CAPSULE | Refills: 0 | Status: SHIPPED | OUTPATIENT
Start: 2022-06-14 | End: 2022-06-24

## 2022-06-14 SDOH — ECONOMIC STABILITY: FOOD INSECURITY: WITHIN THE PAST 12 MONTHS, THE FOOD YOU BOUGHT JUST DIDN'T LAST AND YOU DIDN'T HAVE MONEY TO GET MORE.: PATIENT DECLINED

## 2022-06-14 SDOH — ECONOMIC STABILITY: FOOD INSECURITY: WITHIN THE PAST 12 MONTHS, YOU WORRIED THAT YOUR FOOD WOULD RUN OUT BEFORE YOU GOT MONEY TO BUY MORE.: PATIENT DECLINED

## 2022-06-14 ASSESSMENT — PATIENT HEALTH QUESTIONNAIRE - PHQ9
1. LITTLE INTEREST OR PLEASURE IN DOING THINGS: 0
SUM OF ALL RESPONSES TO PHQ QUESTIONS 1-9: 0
SUM OF ALL RESPONSES TO PHQ9 QUESTIONS 1 & 2: 0
2. FEELING DOWN, DEPRESSED OR HOPELESS: 0

## 2022-06-14 ASSESSMENT — SOCIAL DETERMINANTS OF HEALTH (SDOH): HOW HARD IS IT FOR YOU TO PAY FOR THE VERY BASICS LIKE FOOD, HOUSING, MEDICAL CARE, AND HEATING?: PATIENT DECLINED

## 2022-06-14 NOTE — PROGRESS NOTES
Gabriela Harris M.D.  [address] [facility]  Phone:  [phone]  Fax:  [phone]    CHIEF COMPLAINT:  Chief Complaint   Patient presents with    Skin Lesion     pt states she has a lesion on her chest that is very painful. It started out looking like a mole that looks similar to one on her leg but this one has gotten bigger, and painful, Pt was to be referred to have this removed but specialist never called her. HISTORY OF PRESENT ILLNESS:  Ms. Homero Madden is a 73 y.o. female  who presents for follow up. She is complaining of a tender lesion in the center of her chest for the past few days. It has been there for a few days and is very painful. It has gotten bigger. She would like to be referred to a specialist to have it removed. No other complaints. Taking medications as prescribed. Medications reviewed and updated. HISTORY:  No Known Allergies      REVIEW OF SYSTEMS:  Review of systems is as indicated in HPI otherwise negative. PHYSICAL EXAM:  Vital Signs -   Visit Vitals  /77 (Site: Left Upper Arm)   Pulse 66   Temp 97.8 °F (36.6 °C) (Temporal)   Resp 14   Ht 5' 5.5\" (1.664 m)   Wt 202 lb 3.2 oz (91.7 kg)   SpO2 96%   BMI 33.14 kg/m²        Physical Exam  Vitals and nursing note reviewed. Constitutional:       Appearance: Normal appearance. HENT:      Head: Normocephalic and atraumatic. Nose: Nose normal.      Mouth/Throat:      Mouth: Mucous membranes are moist.   Eyes:      Extraocular Movements: Extraocular movements intact. Pupils: Pupils are equal, round, and reactive to light. Cardiovascular:      Rate and Rhythm: Normal rate and regular rhythm. Pulses: Normal pulses. Pulmonary:      Effort: Pulmonary effort is normal.      Breath sounds: Normal breath sounds. Abdominal:      General: Abdomen is flat. Palpations: Abdomen is soft. Musculoskeletal:         General: Normal range of motion. Cervical back: Normal range of motion and neck supple. Skin:     General: Skin is warm and dry. Findings: Lesion present. Comments: There is a tender 1.5 cm x 1.5 cm lesion in the center of the chest. Erythematous and indurated. Neurological:      Mental Status: She is alert. Psychiatric:         Mood and Affect: Mood normal.         Behavior: Behavior normal.       PHQ:  PHQ-9  6/14/2022   Little interest or pleasure in doing things 0   Little interest or pleasure in doing things -   Feeling down, depressed, or hopeless 0   Trouble falling or staying asleep, or sleeping too much -   Feeling tired or having little energy -   Poor appetite or overeating -   Feeling bad about yourself - or that you are a failure or have let yourself or your family down -   Trouble concentrating on things, such as reading the newspaper or watching television -   Moving or speaking so slowly that other people could have noticed. Or the opposite - being so fidgety or restless that you have been moving around a lot more than usual -   Thoughts that you would be better off dead, or of hurting yourself in some way -   PHQ-2 Score 0   Total Score PHQ 2 -   PHQ-9 Total Score 0   If you checked off any problems, how difficult have these problems made it for you to do your work, take care of things at home, or get along with other people? -       LABS    Office Visit on 05/11/2022   Component Date Value Ref Range Status    Atopobium Vaginae 05/11/2022 Low - 0  Score Final    Bacterial Vaginosis Associated Allyssa* 05/11/2022 Low - 0  Score Final    MEGASHAERA 05/11/2022 Low - 0  Score Final    Comment: Calculate total score by adding the 3 individual bacterial  vaginosis (BV) marker scores together. Total score is  interpreted as follows: Total score 0-1: Indicates the absence of BV. Total score   2: Indeterminate for BV.  Additional clinical                   data should be evaluated to establish a diagnosis. Total score 3-6: Indicates the presence of BV. This test was developed and its performance characteristics  determined by [facility].  It has not been cleared or approved  by the Food and Drug Administration.  Grace Albicans, JAMAL 05/11/2022 Negative  Negative Final    Candida glabrata by PCR 05/11/2022 Positive* Negative Final    Comment: Published data demonstrate that up to 65% of Candida glabrata  identified in cases of vaginal candidiasis have decreased  susceptibility to fluconazole.  Trichomonas Vaginalis by JAMAL 05/11/2022 Negative  Negative Final    Chlamydia trachomatis, JAMAL 05/11/2022 Negative  Negative Final    NEISSERIA GONORRHOEAE, JAMAL 05/11/2022 Negative  Negative Final       IMPRESSION/PLAN     Diagnosis Orders   1. Sebaceous cyst  cephALEXin (KEFLEX) 500 MG [facility] - Jareth Douglas MD, General Surgery, [facility]   2. At high risk for falls         Follow up and Dispositions:  Return in about 4 months (around 10/14/2022). Will start Keflex 500 mg tid x 10 days. Will refer to the general surgeon to have it removed. Tully Frankel, MD    Dictated using voice recognition software. Proofread, but unrecognized voice recognition errors may exist.    On the basis of positive falls risk screening, assessment and plan is as follows: patient declines any further evaluation/treatment for increased falls risk.

## 2022-06-22 ENCOUNTER — HOSPITAL ENCOUNTER (OUTPATIENT)
Dept: MAMMOGRAPHY | Age: 72
Discharge: HOME | End: 2022-06-25
Payer: MEDICARE

## 2022-06-22 DIAGNOSIS — Z12.31 ENCOUNTER FOR SCREENING MAMMOGRAM FOR MALIGNANT NEOPLASM OF BREAST: ICD-10-CM

## 2022-06-22 PROCEDURE — 77067 SCR MAMMO BI INCL CAD: CPT

## 2022-06-29 ENCOUNTER — TELEPHONE (OUTPATIENT)
Dept: PRIMARY CARE CLINIC | Facility: CLINIC | Age: 72
End: 2022-06-29

## 2022-06-29 DIAGNOSIS — R92.1 CALCIFICATION OF LEFT BREAST: Primary | ICD-10-CM

## 2022-06-29 NOTE — TELEPHONE ENCOUNTER
[id number] Melva Choe per Dr Hernandez Bidcassie verbal order  to give patient sample of Breztri.  Pt notified, sample at

## 2022-07-14 ENCOUNTER — TELEPHONE (OUTPATIENT)
Dept: PRIMARY CARE CLINIC | Facility: CLINIC | Age: 72
End: 2022-07-14

## 2022-07-14 NOTE — TELEPHONE ENCOUNTER
Spoke with patient, informed [place] and surgeon has been trying to call her to get scheduled but she has not answered the phone. Gave patients surgeons phone number, she will follow up with them.

## 2022-07-19 ENCOUNTER — HOSPITAL ENCOUNTER (OUTPATIENT)
Dept: MAMMOGRAPHY | Age: 72
Discharge: HOME | End: 2022-07-22
Payer: MEDICARE

## 2022-07-19 DIAGNOSIS — R92.8 ABNORMAL SCREENING MAMMOGRAM: ICD-10-CM

## 2022-07-19 PROCEDURE — 77065 DX MAMMO INCL CAD UNI: CPT

## 2022-07-21 ENCOUNTER — HOSPITAL ENCOUNTER (OUTPATIENT)
Dept: MAMMOGRAPHY | Age: 72
Discharge: HOME | End: 2022-07-24
Payer: MEDICARE

## 2022-07-21 VITALS
HEART RATE: 75 BPM | TEMPERATURE: 97.7 F | OXYGEN SATURATION: 100 % | DIASTOLIC BLOOD PRESSURE: 58 MMHG | SYSTOLIC BLOOD PRESSURE: 123 MMHG

## 2022-07-21 DIAGNOSIS — R92.8 ABNORMAL MAMMOGRAM OF LEFT BREAST: ICD-10-CM

## 2022-07-21 PROCEDURE — 77065 DX MAMMO INCL CAD UNI: CPT

## 2022-07-21 PROCEDURE — 88305 TISSUE EXAM BY PATHOLOGIST: CPT

## 2022-07-21 PROCEDURE — 76098 X-RAY EXAM SURGICAL SPECIMEN: CPT

## 2022-07-21 PROCEDURE — 19081 BX BREAST 1ST LESION STRTCTC: CPT

## 2022-07-21 PROCEDURE — 2500000003 HC RX 250 WO HCPCS: Performed by: FAMILY MEDICINE

## 2022-07-21 RX ORDER — LIDOCAINE HYDROCHLORIDE AND EPINEPHRINE 10; 10 MG/ML; UG/ML
10 INJECTION, SOLUTION INFILTRATION; PERINEURAL ONCE
Status: DISCONTINUED | OUTPATIENT
Start: 2022-07-21 | End: 2022-07-25 | Stop reason: HOSPADM

## 2022-07-21 RX ORDER — MAGNESIUM HYDROXIDE 1200 MG/15ML
1000 LIQUID ORAL CONTINUOUS
Status: DISCONTINUED | OUTPATIENT
Start: 2022-07-21 | End: 2022-07-25 | Stop reason: HOSPADM

## 2022-07-21 RX ORDER — LIDOCAINE HYDROCHLORIDE AND EPINEPHRINE 10; 10 MG/ML; UG/ML
5 INJECTION, SOLUTION INFILTRATION; PERINEURAL ONCE
Status: COMPLETED | OUTPATIENT
Start: 2022-07-21 | End: 2022-07-21

## 2022-07-21 RX ORDER — LIDOCAINE HYDROCHLORIDE 10 MG/ML
5 INJECTION, SOLUTION INFILTRATION; PERINEURAL ONCE
Status: COMPLETED | OUTPATIENT
Start: 2022-07-21 | End: 2022-07-21

## 2022-07-21 RX ADMIN — MAGNESIUM HYDROXIDE 1000 ML: 1200 LIQUID ORAL at 10:48

## 2022-07-21 RX ADMIN — LIDOCAINE HYDROCHLORIDE,EPINEPHRINE BITARTRATE 5 ML: 10; .01 INJECTION, SOLUTION INFILTRATION; PERINEURAL at 10:46

## 2022-07-21 RX ADMIN — LIDOCAINE HYDROCHLORIDE 5 ML: 10 INJECTION, SOLUTION INFILTRATION; PERINEURAL at 10:47

## 2022-07-21 ASSESSMENT — PAIN DESCRIPTION - ORIENTATION: ORIENTATION: LEFT

## 2022-08-01 ENCOUNTER — TELEMEDICINE (OUTPATIENT)
Dept: PRIMARY CARE CLINIC | Facility: CLINIC | Age: 72
End: 2022-08-01
Payer: MEDICARE

## 2022-08-01 DIAGNOSIS — Z83.49 FAMILY HISTORY OF THYROID DISEASE: ICD-10-CM

## 2022-08-01 DIAGNOSIS — E55.9 VITAMIN D DEFICIENCY: ICD-10-CM

## 2022-08-01 DIAGNOSIS — Z79.899 ENCOUNTER FOR LONG-TERM (CURRENT) USE OF MEDICATIONS: ICD-10-CM

## 2022-08-01 DIAGNOSIS — Z13.220 SCREENING FOR LIPID DISORDERS: ICD-10-CM

## 2022-08-01 DIAGNOSIS — I10 ESSENTIAL HYPERTENSION: ICD-10-CM

## 2022-08-01 DIAGNOSIS — E11.40 TYPE 2 DIABETES MELLITUS WITH DIABETIC NEUROPATHY, WITHOUT LONG-TERM CURRENT USE OF INSULIN (HCC): Primary | ICD-10-CM

## 2022-08-01 DIAGNOSIS — R53.82 CHRONIC FATIGUE: ICD-10-CM

## 2022-08-01 PROCEDURE — 99213 OFFICE O/P EST LOW 20 MIN: CPT | Performed by: FAMILY MEDICINE

## 2022-08-01 PROCEDURE — 1123F ACP DISCUSS/DSCN MKR DOCD: CPT | Performed by: FAMILY MEDICINE

## 2022-08-01 PROCEDURE — 3046F HEMOGLOBIN A1C LEVEL >9.0%: CPT | Performed by: FAMILY MEDICINE

## 2022-08-01 RX ORDER — LANCETS 30 GAUGE
1 EACH MISCELLANEOUS 2 TIMES DAILY
Qty: 100 EACH | Refills: 11 | Status: SHIPPED | OUTPATIENT
Start: 2022-08-01

## 2022-08-01 RX ORDER — BLOOD-GLUCOSE METER
EACH MISCELLANEOUS
Qty: 1 KIT | Refills: 0 | Status: SHIPPED | OUTPATIENT
Start: 2022-08-01

## 2022-08-01 NOTE — PROGRESS NOTES
Mental status: [x] Alert and awake  [x] Oriented to person/place/time [x] Able to follow commands    [] Abnormal -     Eyes:   EOM    [x]  Normal    [] Abnormal -   Sclera  [x]  Normal    [] Abnormal -          Discharge [x]  None visible   [] Abnormal -     HENT: [x] Normocephalic, atraumatic  [] Abnormal -   [x] Mouth/Throat: Mucous membranes are moist    External Ears [x] Normal  [] Abnormal -    Neck: [x] No visualized mass [] Abnormal -     Pulmonary/Chest: [x] Respiratory effort normal   [x] No visualized signs of difficulty breathing or respiratory distress             Musculoskeletal:   [x] Normal gait with no signs of ataxia         [x] Normal range of motion of neck        [] Abnormal -     Neurological:        [x] No Facial Asymmetry (Cranial nerve 7 motor function) (limited exam due to video visit)          [x] No gaze palsy        [] Abnormal -          Skin:        [x] No significant exanthematous lesions or discoloration noted on facial skin         [] Abnormal -            Psychiatric:       [x] Normal Affect [] Abnormal -        [x] No Hallucinations    PHQ:  PHQ-9  6/14/2022   Little interest or pleasure in doing things 0   Little interest or pleasure in doing things -   Feeling down, depressed, or hopeless 0   Trouble falling or staying asleep, or sleeping too much -   Feeling tired or having little energy -   Poor appetite or overeating -   Feeling bad about yourself - or that you are a failure or have let yourself or your family down -   Trouble concentrating on things, such as reading the newspaper or watching television -   Moving or speaking so slowly that other people could have noticed.  Or the opposite - being so fidgety or restless that you have been moving around a lot more than usual -   Thoughts that you would be better off dead, or of hurting yourself in some way -   PHQ-2 Score 0   Total Score PHQ 2 -   PHQ-9 Total Score 0   If you checked off any problems, how difficult have these problems made it for you to do your work, take care of things at home, or get along with other people? -       LABS  No results found for this visit on 08/01/22. Office Visit on 05/11/2022   Component Date Value Ref Range Status    Atopobium Vaginae 05/11/2022 Low - 0  Score Final    Bacterial Vaginosis Associated Allyssa* 05/11/2022 Low - 0  Score Final    MEGASHAERA 05/11/2022 Low - 0  Score Final    Comment: Calculate total score by adding the 3 individual bacterial  vaginosis (BV) marker scores together. Total score is  interpreted as follows: Total score 0-1: Indicates the absence of BV. Total score   2: Indeterminate for BV. Additional clinical                   data should be evaluated to establish a                   diagnosis. Total score 3-6: Indicates the presence of BV. This test was developed and its performance characteristics  determined by [facility].  It has not been cleared or approved  by the Food and Drug Administration. Grace Albicans, JAMAL 05/11/2022 Negative  Negative Final    Candida glabrata by PCR 05/11/2022 Positive (A) Negative Final    Comment: Published data demonstrate that up to 65% of Candida glabrata  identified in cases of vaginal candidiasis have decreased  susceptibility to fluconazole. Trichomonas Vaginalis by JAMAL 05/11/2022 Negative  Negative Final    Chlamydia trachomatis, JAMAL 05/11/2022 Negative  Negative Final    Neisseria Gonorrhoeae, JAMAL 05/11/2022 Negative  Negative Final       IMPRESSION/PLAN     Diagnosis Orders   1. Type 2 diabetes mellitus with diabetic neuropathy, without long-term current use of insulin (HCC)  CBC with Auto Differential    Comprehensive Metabolic Panel    Hemoglobin A1C    Blood Glucose Monitoring Suppl (ONE TOUCH ULTRA 2) w/Device KIT    blood glucose test strips (ASCENSIA AUTODISC VI;ONE TOUCH ULTRA TEST VI) strip    Lancets MISC      2. Essential hypertension  CBC with Auto Differential    Comprehensive Metabolic Panel      3. Vitamin D deficiency  Vitamin D 25 Hydroxy      4. Chronic fatigue  T4, Free    TSH      5. Family history of thyroid disease  T4, Free    TSH      6. Screening for lipid disorders  Lipid Panel      7. Encounter for long-term (current) use of medications  CBC with Auto Differential    Comprehensive Metabolic Panel          Follow up and Dispositions:  Return in about 4 months (around 12/1/2022). Patient will continue current medications. Refilled the above medications. Reviewed medications and side effects in detail. Will check the above labs. Reviewed most recent labs. Reviewed diet, exercise and weight control. Cardiovascular risks and recommendations reviewed. Patient encouraged to follow a diabetic/low sodium diet. Use of aspirin to prevent MIs and TIAs discussed. Patient will check blood sugars once daily. Consent: This patient and/or their healthcare decision maker is aware that this patient-initiated Telehealth encounter is a billable service, with coverage as determined by their insurance carrier. Patient is aware that they may receive a bill and has provided verbal consent to proceed: Yes     The patient is being evaluated by a Virtual Visit (video visit) encounter to address concerns as mentioned above. A caregiver was present when appropriate. Due to this being a TeleHealth encounter (During BTOHS-56 public health emergency), evaluation of the following organ systems was limited: Vitals/Constitutional/EENT/Resp/CV/GI//MS/Neuro/Skin/Heme-Lymph-Imm. Pursuant to the emergency declaration under the [facility], [facility] waiver authority and the [employer] Act, this Virtual Visit was conducted with patient's (and/or legal guardian's) consent, to reduce the patient's risk of exposure to COVID-19 and provide necessary medical care.   The patient (and/or legal guardian) has also been advised to contact this office for worsening conditions or problems, and seek emergency medical treatment and/or call 911 if deemed necessary. Patient identification was verified at the start of the visit: YES    Services were provided through a video synchronous discussion virtually to substitute for in-person clinic visit. Patient and provider were located at their individual homes. Shani Eagle, was evaluated through a synchronous (real-time) audio-video encounter. The patient (or guardian if applicable) is aware that this is a billable service, which includes applicable co-pays. This Virtual Visit was conducted with patient's (and/or legal guardian's) consent. The visit was conducted pursuant to the emergency declaration under the [facility], [address] [place] [address] authority and the [employer] General Act. Patient identification was verified, and a caregiver was present when appropriate. The patient was located at Home: [address] Provider was located at [facility] ([address] [place] [address]): [address].. Total Time: minutes: 11-20 minutes. Dictated using voice recognition software.  Proofread, but unrecognized voice recognition errors may exist.    Dewayne Herbert MD  08/01/22

## 2022-08-04 DIAGNOSIS — E11.40 TYPE 2 DIABETES MELLITUS WITH DIABETIC NEUROPATHY, WITHOUT LONG-TERM CURRENT USE OF INSULIN (HCC): ICD-10-CM

## 2022-08-04 DIAGNOSIS — R53.82 CHRONIC FATIGUE: ICD-10-CM

## 2022-08-04 DIAGNOSIS — Z13.220 SCREENING FOR LIPID DISORDERS: ICD-10-CM

## 2022-08-04 DIAGNOSIS — I10 ESSENTIAL HYPERTENSION: ICD-10-CM

## 2022-08-04 DIAGNOSIS — Z83.49 FAMILY HISTORY OF THYROID DISEASE: ICD-10-CM

## 2022-08-04 DIAGNOSIS — E55.9 VITAMIN D DEFICIENCY: ICD-10-CM

## 2022-08-04 DIAGNOSIS — Z79.899 ENCOUNTER FOR LONG-TERM (CURRENT) USE OF MEDICATIONS: ICD-10-CM

## 2022-08-04 LAB
25(OH)D3 SERPL-MCNC: 25.8 NG/ML (ref 30–100)
ALBUMIN SERPL-MCNC: 3.9 G/DL (ref 3.2–4.6)
ALBUMIN/GLOB SERPL: 1.3 {RATIO} (ref 1.2–3.5)
ALP SERPL-CCNC: 73 U/L (ref 50–136)
ALT SERPL-CCNC: 15 U/L (ref 12–65)
ANION GAP SERPL CALC-SCNC: 5 MMOL/L (ref 7–16)
AST SERPL-CCNC: 12 U/L (ref 15–37)
BASOPHILS # BLD: 0.1 K/UL (ref 0–0.2)
BASOPHILS NFR BLD: 1 % (ref 0–2)
BILIRUB SERPL-MCNC: 0.6 MG/DL (ref 0.2–1.1)
BUN SERPL-MCNC: 12 MG/DL (ref 8–23)
CALCIUM SERPL-MCNC: 9.3 MG/DL (ref 8.3–10.4)
CHLORIDE SERPL-SCNC: 107 MMOL/L (ref 98–107)
CHOLEST SERPL-MCNC: 144 MG/DL
CO2 SERPL-SCNC: 27 MMOL/L (ref 21–32)
CREAT SERPL-MCNC: 0.7 MG/DL (ref 0.6–1)
DIFFERENTIAL METHOD BLD: ABNORMAL
EOSINOPHIL # BLD: 0.2 K/UL (ref 0–0.8)
EOSINOPHIL NFR BLD: 2 % (ref 0.5–7.8)
ERYTHROCYTE [DISTWIDTH] IN BLOOD BY AUTOMATED COUNT: 12.7 % (ref 11.9–14.6)
GLOBULIN SER CALC-MCNC: 3.1 G/DL (ref 2.3–3.5)
GLUCOSE SERPL-MCNC: 130 MG/DL (ref 65–100)
HCT VFR BLD AUTO: 41.7 % (ref 35.8–46.3)
HDLC SERPL-MCNC: 54 MG/DL (ref 40–60)
HDLC SERPL: 2.7 {RATIO}
HGB BLD-MCNC: 12.9 G/DL (ref 11.7–15.4)
IMM GRANULOCYTES # BLD AUTO: 0 K/UL (ref 0–0.5)
IMM GRANULOCYTES NFR BLD AUTO: 0 % (ref 0–5)
LDLC SERPL CALC-MCNC: 69.8 MG/DL
LYMPHOCYTES # BLD: 1.8 K/UL (ref 0.5–4.6)
LYMPHOCYTES NFR BLD: 25 % (ref 13–44)
MCH RBC QN AUTO: 30.9 PG (ref 26.1–32.9)
MCHC RBC AUTO-ENTMCNC: 30.9 G/DL (ref 31.4–35)
MCV RBC AUTO: 99.8 FL (ref 79.6–97.8)
MONOCYTES # BLD: 0.3 K/UL (ref 0.1–1.3)
MONOCYTES NFR BLD: 4 % (ref 4–12)
NEUTS SEG # BLD: 4.9 K/UL (ref 1.7–8.2)
NEUTS SEG NFR BLD: 68 % (ref 43–78)
NRBC # BLD: 0 K/UL (ref 0–0.2)
PLATELET # BLD AUTO: 313 K/UL (ref 150–450)
PMV BLD AUTO: 12.1 FL (ref 9.4–12.3)
POTASSIUM SERPL-SCNC: 4.4 MMOL/L (ref 3.5–5.1)
PROT SERPL-MCNC: 7 G/DL (ref 6.3–8.2)
RBC # BLD AUTO: 4.18 M/UL (ref 4.05–5.2)
SODIUM SERPL-SCNC: 139 MMOL/L (ref 136–145)
T4 FREE SERPL-MCNC: 1 NG/DL (ref 0.78–1.46)
TRIGL SERPL-MCNC: 101 MG/DL (ref 35–150)
TSH, 3RD GENERATION: 0.75 UIU/ML (ref 0.36–3.74)
VLDLC SERPL CALC-MCNC: 20.2 MG/DL (ref 6–23)
WBC # BLD AUTO: 7.2 K/UL (ref 4.3–11.1)

## 2022-08-05 LAB
EST. AVERAGE GLUCOSE BLD GHB EST-MCNC: 126 MG/DL
HBA1C MFR BLD: 6 % (ref 4.8–5.6)

## 2022-08-15 RX ORDER — MELOXICAM 7.5 MG/1
TABLET ORAL
Qty: 60 TABLET | Refills: 5 | Status: SHIPPED | OUTPATIENT
Start: 2022-08-15

## 2022-09-22 ENCOUNTER — OFFICE VISIT (OUTPATIENT)
Dept: SURGERY | Age: 72
End: 2022-09-22
Payer: MEDICARE

## 2022-09-22 VITALS
DIASTOLIC BLOOD PRESSURE: 84 MMHG | HEIGHT: 65 IN | SYSTOLIC BLOOD PRESSURE: 144 MMHG | BODY MASS INDEX: 32.72 KG/M2 | WEIGHT: 196.4 LBS | HEART RATE: 75 BPM | OXYGEN SATURATION: 98 %

## 2022-09-22 DIAGNOSIS — N60.82 SEBACEOUS CYST OF BREAST, LEFT: Primary | ICD-10-CM

## 2022-09-22 DIAGNOSIS — R22.2 CHEST WALL MASS: Primary | ICD-10-CM

## 2022-09-22 PROCEDURE — 1123F ACP DISCUSS/DSCN MKR DOCD: CPT | Performed by: STUDENT IN AN ORGANIZED HEALTH CARE EDUCATION/TRAINING PROGRAM

## 2022-09-22 PROCEDURE — 99204 OFFICE O/P NEW MOD 45 MIN: CPT | Performed by: STUDENT IN AN ORGANIZED HEALTH CARE EDUCATION/TRAINING PROGRAM

## 2022-09-22 PROCEDURE — 11402 EXC TR-EXT B9+MARG 1.1-2 CM: CPT | Performed by: STUDENT IN AN ORGANIZED HEALTH CARE EDUCATION/TRAINING PROGRAM

## 2022-09-22 NOTE — PROGRESS NOTES
General Surgery New Patient Office Note:    9/22/2022    Nick Ross  MRN: [id number]      CHIEF COMPLAINT: Sebaceous cyst    PRIMARY CARE PHYSICIAN: Ascencion Matamoros MD    HISTORY: Patient presents with a sebaceous cyst for the last couple years. Patient stated it recently got infected in July and she was on antibiotics but it has subsequently come down and is. She states it does cause her some discomfort and it rubs across her clothes. Denies any blood thinners    REVIEW OF SYSTEMS: 10 Point ROS negative except what is in HPI      Past Medical History:   Diagnosis Date    Adverse effect of anesthesia     slow to wake up     Arthritis     BMI 34.0-34.9,adult     Depression     on med for control     Diabetes mellitus type 2, diet-controlled ([facility] [address] 9/6/2020    Former smoker     Hypercholesterolemia     on med for control     Hypertension     on med for control     Low vitamin D level 9/6/2020    Neuropathy        Current Outpatient Medications   Medication Sig Dispense Refill    meloxicam (MOBIC) 7.5 MG tablet TAKE 1 TABLET BY MOUTH TWICE DAILY 60 tablet 5    blood glucose test strips (ASCENSIA AUTODISC VI;ONE TOUCH ULTRA TEST VI) strip 1 each by In Vitro route 2 times daily As needed.  100 each 11    Lancets MISC 1 each by Does not apply route 2 times daily 100 each 11    amLODIPine (NORVASC) 10 MG tablet TAKE 1 TABLET BY MOUTH DAILY FOR HIGH BLOOD PRESSURE      butalbital-acetaminophen-caffeine (FIORICET, ESGIC) -40 MG per tablet Take 1 tablet by mouth daily as needed      vitamin D (CHOLECALCIFEROL) 25 MCG (1000 UT) TABS tablet Take 1,000 Units by mouth daily      citalopram (CELEXA) 20 MG tablet Take 20 mg by mouth daily      cyanocobalamin 1000 MCG tablet Take 1,000 mcg by mouth daily      gabapentin (NEURONTIN) 300 MG capsule TAKE 1 CAPSULE BY MOUTH THREE TIMES DAILY AS NEEDED FOR PAIN      metFORMIN (GLUCOPHAGE-XR) 500 MG extended release tablet Take 500 mg by mouth Daily with supper pravastatin (PRAVACHOL) 40 MG tablet Take 40 mg by mouth      Blood Glucose Monitoring Suppl (ONE TOUCH ULTRA 2) w/Device KIT Use to check blood sugars twice daily 1 kit 0    acetaminophen (TYLENOL) 500 MG tablet Take 1,000 mg by mouth every 6 hours as needed      cyclobenzaprine (FLEXERIL) 10 MG tablet Take 10 mg by mouth 2 times daily (Patient not taking: Reported on 2022)      senna-docusate (PERICOLACE) 8.6-50 MG per tablet Take 1 tablet by mouth daily (Patient not taking: Reported on 2022)       No current facility-administered medications for this visit. Family History   Problem Relation Age of Onset    Cancer Mother     Breast Cancer Mother 79    Heart Attack Father     Cancer Sister     Diabetes Son         DM type 1    Other Son         swine flu    Breast Cancer Maternal Aunt        Social History     Socioeconomic History    Marital status: Single   Tobacco Use    Smoking status: Former     Packs/day: 0.50     Types: Cigarettes     Quit date: 2019     Years since quitting: 3.2    Smokeless tobacco: Never   Vaping Use    Vaping Use: Never used   Substance and Sexual Activity    Alcohol use: Yes    Drug use: Yes     Types: Marijuana Judy Hebert)   Social History Narrative    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 She has a daughter, one granddaughter. 25 year son that  of swine flu. Social Determinants of Health     Financial Resource Strain: Unknown    Difficulty of Paying Living Expenses: Patient refused   Food Insecurity: Unknown    Worried About Running Out of Food in the Last Year: Patient refused    Ran Out of Food in the Last Year: Patient refused         PHYSICAL EXAMINATION:    BP (!) 144/84   Pulse 75   Ht 5' 5\" (1.651 m)   Wt 196 lb 6.4 oz (89.1 kg)   SpO2 98%   BMI 32.68 kg/m²     General Appearance AOx3, in no acute distress  Eyes Conjunctivae/corneas clear. PERRL, EOM's intact.  No scleral icterus  Ears, Nose, Throat ENT exam normal, no neck nodes or sinus tenderness  Neck supple, no significant adenopathy. No notable JVD  Respiratory No chest wall deformities or tenderness, respiratory effort normal, no use of accessory muscles. Cardiovascular RRR. No chest wall tenderness. Gastrointestinal Abdomen soft, nontender, nondistended. BS x4. No rebound, guarding, or rigidity present. No palpable masses. No CVA tenderness  Lymphatics No palpable lymphadenopathy, no hepatosplenomegaly  Musculoskeletal No joint tenderness, deformity or swelling. Full ROM UE/LE. Distal pulses intact UE/LE. No edema, cyanosis, or venous stasis changes. Skin Normal coloration and turgor, no rashes, no suspicious skin lesions noted  Neurological alert, oriented, normal speech, no focal findings or movement disorder noted, CN II-XII intact  Psychiatric Alert and oriented, appropriate affect      STUDIES: Mammogram Result (most recent): MAMMOGRAM POST BX CLIP PLACEMENT LEFT 07/21/2022    Addendum 7/27/2022 11:11 AM  Addendum: Camille Guerrero, accession number: [id number]  Date: 7/21/2022  Pathology was noted as A: Left breast calcifications at 3:00 position, core  biopsy: Fibrocystic mastopathy having minimal intraductal hyperplasia, apocrine  metaplasia and microcalcifications. Results concordant with imaging and return for annual bilateral screening  mammogram in June 2023 is recommended per benign percutaneous biopsy protocol. Patient will be notified of results and follow-up recommendation. Pathology  report and follow-up recommendation faxed to Dr. Elvie Quinn on 7/22/2022  by RT Naveen(RAQUEL)(M). Narrative  Stereotactic Breast Biopsy  Digital diagnostic mammogram  Specimen radiograph    INDICATION: Indeterminate faint cluster of calcifications left lateral breast at  middle depth. No previous personal malignancy or breast surgery. Her mother had  postmenopausal breast cancer.     COMPARISON: Mammography 7/19/2022, 6/22/2022    PROCEDURE: After informed consent was obtained, the left lateral clustered  calcifications centered at 3:00 were localized from a lateral approach. The  overlying skin was prepped and draped in sterile fashion. Total of 8 mL of 1%  Lidocaine was used for superficial anesthesia. Using stereotactic guidance, a 9 gauge needle was advanced into the breast.  After appropriate position of the needle was confirmed with stereotactic  imaging, 4 core samples were obtained with vacuum assistance. A clip was left to  alondra the biopsy site. The needle was withdrawn and hemostasis was obtained. Patient tolerated the procedure well without complications. Specimen Radiograph:  Multiple calcifications are present in the stereotactic  specimen. Postprocedure Mammogram:  Postprocedure left mammogram shows the biopsy clip in  expected location. Impression  Successful vacuum-assisted stereotactic core biopsy of  calcifications in the left lateral breast.    Pathology results are currently pending. IMPRESSION:  Chest wall sebaceous cyst    PLAN:  Patient presents with a chest wall sebaceous cyst.  She has 2 areas of pitting from her chronic cyst.  I have asked her if she would like this surgically excised in the office or in the hospital.  She states she will do it in the office. Risks of surgery discussed with pt and/or family present include risks of anesthesia (cardiopulmonary complications), MI, CVA, DVT,pain, bleeding, infection, injury to local viscera, wound problems, conversion to open procedure if laparoscopic procedure planned, and incomplete symptom resolution. They understand and agree to proceed. DESCRIPTION OF PROCEDURE:   Consent was confirmed and placed on the chart. The patient's was then prepped and draped in the usual sterile fashion. The procedure began by making an incision over the apex of the mass. Scalpel was used to sharply dissect through the subcuticular tissue to the capsule of the mass.  At this point I dissected circumferentially around the mass and its capsule. It was removed in its entirety. Further inspection of the cavity noted no other acute abnormalities. Hemostasis was achieved with electric cautery. The wound was irrigated with normal saline. It measured 1.5 cm x 1 cm    3-0 Monocryl suture was used to close the skin in a simple interrupted fashion. dermabond was placed on the incision. At this time, the procedure was complete.  The patient tolerated procedure well

## 2022-09-24 DIAGNOSIS — G89.29 CHRONIC PAIN OF RIGHT KNEE: Primary | ICD-10-CM

## 2022-09-24 DIAGNOSIS — M25.561 CHRONIC PAIN OF RIGHT KNEE: Primary | ICD-10-CM

## 2022-09-25 RX ORDER — TRAMADOL HYDROCHLORIDE 50 MG/1
TABLET ORAL
Qty: 60 TABLET | Refills: 1 | Status: SHIPPED | OUTPATIENT
Start: 2022-09-25 | End: 2022-10-25

## 2022-09-29 ENCOUNTER — TELEPHONE (OUTPATIENT)
Dept: NUTRITION | Age: 72
End: 2022-09-29

## 2022-09-29 NOTE — TELEPHONE ENCOUNTER
Nutrition Counseling: Contacted pt regarding referral. See notes documented in Nutrition Counseling Referral for details. No further follow-up contact from pt. Will close referral for this office.     [facility]  [phone]

## 2022-10-10 NOTE — PROGRESS NOTES
The patient is a 73 y.o.  who is seen to recheck of lichen sclerosus after using Clobetasol. . States the Clobetasol worked well while using which she states was for approx 2 weeks but once finished symptoms came back. Does note improvements and is requesting refills on medication. HISTORY:      No LMP recorded. Patient has had a hysterectomy. Sexual History:  has sex with males  Contraception:  status post hysterectomy  Current Outpatient Medications on File Prior to Visit   Medication Sig Dispense Refill    traMADol (ULTRAM) 50 MG tablet TAKE 1 TABLET BY MOUTH EVERY 6 HOURS AS NEEDED FOR PAIN. MAX DAILY AMOUNT: 200 MG. INDICATIONS: PAIN 60 tablet 1    meloxicam (MOBIC) 7.5 MG tablet TAKE 1 TABLET BY MOUTH TWICE DAILY 60 tablet 5    Blood Glucose Monitoring Suppl (ONE TOUCH ULTRA 2) w/Device KIT Use to check blood sugars twice daily 1 kit 0    blood glucose test strips (ASCENSIA AUTODISC VI;ONE TOUCH ULTRA TEST VI) strip 1 each by In Vitro route 2 times daily As needed.  100 each 11    Lancets MISC 1 each by Does not apply route 2 times daily 100 each 11    acetaminophen (TYLENOL) 500 MG tablet Take 1,000 mg by mouth every 6 hours as needed      amLODIPine (NORVASC) 10 MG tablet TAKE 1 TABLET BY MOUTH DAILY FOR HIGH BLOOD PRESSURE      butalbital-acetaminophen-caffeine (FIORICET, ESGIC) -40 MG per tablet Take 1 tablet by mouth daily as needed      vitamin D (CHOLECALCIFEROL) 25 MCG (1000 UT) TABS tablet Take 1,000 Units by mouth daily      citalopram (CELEXA) 20 MG tablet Take 20 mg by mouth daily      cyanocobalamin 1000 MCG tablet Take 1,000 mcg by mouth daily      gabapentin (NEURONTIN) 300 MG capsule TAKE 1 CAPSULE BY MOUTH THREE TIMES DAILY AS NEEDED FOR PAIN      metFORMIN (GLUCOPHAGE-XR) 500 MG extended release tablet Take 500 mg by mouth Daily with supper      pravastatin (PRAVACHOL) 40 MG tablet Take 40 mg by mouth       No current facility-administered medications on file prior to visit.       ROS:  Feeling well. No dyspnea or chest pain on exertion. No abdominal pain, change in bowel habits, black or bloody stools. No urinary tract symptoms. GYN ROS: see above. PHYSICAL EXAM:  Blood pressure (!) 140/72, height 5' 5.5\" (1.664 m), weight 193 lb (87.5 kg). The patient appears well, alert, oriented x 3, in no distress. Lungs are clear. Heart RRR, no murmurs. Abdomen soft without tenderness, guarding, mass or organomegaly. Pelvic: VULVA: normal appearing vulva with no masses, tenderness or lesions, vulvar hypopigmentation to bilateral labia . ASSESSMENT:  Encounter Diagnosis   Name Primary? Hypopigmentation Yes       PLAN:  All questions answered  Exam significantly improved from May visit  Will send rx for clobetasol taper   Upon completion of taper- use PRN  To call with any further questions/concerns    1. Hypopigmentation    - clobetasol (TEMOVATE) 0.05 % ointment; Apply topically 4 times daily x 1 week, 3 times daily x 1 week, 2 times daily x 1 week then 1 time daily x 1 week. Dispense: 60 g; Refill: PRN        Supervising physician is Dr. Junior Clarke. Greater than 50% of the 20 minute visit were spent in counseling to the above topics.

## 2022-10-11 ENCOUNTER — OFFICE VISIT (OUTPATIENT)
Dept: OBGYN CLINIC | Age: 72
End: 2022-10-11
Payer: MEDICARE

## 2022-10-11 VITALS
HEIGHT: 66 IN | DIASTOLIC BLOOD PRESSURE: 72 MMHG | WEIGHT: 193 LBS | BODY MASS INDEX: 31.02 KG/M2 | SYSTOLIC BLOOD PRESSURE: 140 MMHG

## 2022-10-11 DIAGNOSIS — L81.9 HYPOPIGMENTATION: Primary | ICD-10-CM

## 2022-10-11 PROCEDURE — 99213 OFFICE O/P EST LOW 20 MIN: CPT | Performed by: NURSE PRACTITIONER

## 2022-10-11 PROCEDURE — 1123F ACP DISCUSS/DSCN MKR DOCD: CPT | Performed by: NURSE PRACTITIONER

## 2022-10-11 RX ORDER — CLOBETASOL PROPIONATE 0.5 MG/G
OINTMENT TOPICAL
Qty: 60 G | Status: SHIPPED | OUTPATIENT
Start: 2022-10-11 | End: 2022-10-20

## 2022-10-13 RX ORDER — METFORMIN HYDROCHLORIDE 500 MG/1
TABLET, EXTENDED RELEASE ORAL
Qty: 30 TABLET | Refills: 5 | Status: SHIPPED | OUTPATIENT
Start: 2022-10-13

## 2022-10-20 ENCOUNTER — TELEMEDICINE (OUTPATIENT)
Dept: PRIMARY CARE CLINIC | Facility: CLINIC | Age: 72
End: 2022-10-20
Payer: MEDICARE

## 2022-10-20 DIAGNOSIS — Z79.899 ENCOUNTER FOR LONG-TERM (CURRENT) USE OF MEDICATIONS: ICD-10-CM

## 2022-10-20 DIAGNOSIS — Z13.220 SCREENING FOR LIPID DISORDERS: ICD-10-CM

## 2022-10-20 DIAGNOSIS — Z23 NEED FOR PNEUMOCOCCAL VACCINATION: ICD-10-CM

## 2022-10-20 DIAGNOSIS — Z23 NEED FOR VACCINATION FOR DTAP: ICD-10-CM

## 2022-10-20 DIAGNOSIS — I10 ESSENTIAL HYPERTENSION: ICD-10-CM

## 2022-10-20 DIAGNOSIS — E55.9 VITAMIN D DEFICIENCY: ICD-10-CM

## 2022-10-20 DIAGNOSIS — E11.40 TYPE 2 DIABETES MELLITUS WITH DIABETIC NEUROPATHY, WITHOUT LONG-TERM CURRENT USE OF INSULIN (HCC): ICD-10-CM

## 2022-10-20 DIAGNOSIS — Z23 NEED FOR SHINGLES VACCINE: ICD-10-CM

## 2022-10-20 DIAGNOSIS — Z00.00 MEDICARE ANNUAL WELLNESS VISIT, SUBSEQUENT: Primary | ICD-10-CM

## 2022-10-20 PROCEDURE — 3044F HG A1C LEVEL LT 7.0%: CPT | Performed by: FAMILY MEDICINE

## 2022-10-20 PROCEDURE — 1123F ACP DISCUSS/DSCN MKR DOCD: CPT | Performed by: FAMILY MEDICINE

## 2022-10-20 PROCEDURE — G0439 PPPS, SUBSEQ VISIT: HCPCS | Performed by: FAMILY MEDICINE

## 2022-10-20 RX ORDER — PNEUMOCOCCAL 20-VALENT CONJUGATE VACCINE 2.2; 2.2; 2.2; 2.2; 2.2; 2.2; 2.2; 2.2; 2.2; 2.2; 2.2; 2.2; 2.2; 2.2; 2.2; 2.2; 4.4; 2.2; 2.2; 2.2 UG/.5ML; UG/.5ML; UG/.5ML; UG/.5ML; UG/.5ML; UG/.5ML; UG/.5ML; UG/.5ML; UG/.5ML; UG/.5ML; UG/.5ML; UG/.5ML; UG/.5ML; UG/.5ML; UG/.5ML; UG/.5ML; UG/.5ML; UG/.5ML; UG/.5ML; UG/.5ML
0.5 INJECTION, SUSPENSION INTRAMUSCULAR ONCE
Qty: 0.5 ML | Refills: 0 | Status: SHIPPED | OUTPATIENT
Start: 2022-10-20 | End: 2022-10-20

## 2022-10-20 RX ORDER — ZOSTER VACCINE RECOMBINANT, ADJUVANTED 50 MCG/0.5
0.5 KIT INTRAMUSCULAR SEE ADMIN INSTRUCTIONS
Qty: 0.5 ML | Refills: 0 | Status: SHIPPED | OUTPATIENT
Start: 2022-10-20 | End: 2023-04-18

## 2022-10-20 RX ORDER — BLOOD SUGAR DIAGNOSTIC
STRIP MISCELLANEOUS
COMMUNITY
Start: 2022-10-10

## 2022-10-20 ASSESSMENT — LIFESTYLE VARIABLES
HOW MANY STANDARD DRINKS CONTAINING ALCOHOL DO YOU HAVE ON A TYPICAL DAY: 1 OR 2
HOW OFTEN DO YOU HAVE A DRINK CONTAINING ALCOHOL: 2-4 TIMES A MONTH

## 2022-10-20 NOTE — PATIENT INSTRUCTIONS
Personalized Preventive Plan for Lilly Juan - 10/20/2022  Medicare offers a range of preventive health benefits. Some of the tests and screenings are paid in full while other may be subject to a deductible, co-insurance, and/or copay. Some of these benefits include a comprehensive review of your medical history including lifestyle, illnesses that may run in your family, and various assessments and screenings as appropriate. After reviewing your medical record and screening and assessments performed today your provider may have ordered immunizations, labs, imaging, and/or referrals for you. A list of these orders (if applicable) as well as your Preventive Care list are included within your After Visit Summary for your review. Other Preventive Recommendations:    A preventive eye exam performed by an eye specialist is recommended every 1-2 years to screen for glaucoma; cataracts, macular degeneration, and other eye disorders. A preventive dental visit is recommended every 6 months. Try to get at least 150 minutes of exercise per week or 10,000 steps per day on a pedometer . Order or download the FREE \"Exercise & Physical Activity: Your Everyday Guide\" from [employer]. Call 3-318.133.1973 or search [employer] online. You need 0808-4381 mg of calcium and 5433-3895 IU of vitamin D per day. It is possible to meet your calcium requirement with diet alone, but a vitamin D supplement is usually necessary to meet this goal.  When exposed to the sun, use a sunscreen that protects against both UVA and UVB radiation with an SPF of 30 or greater. Reapply every 2 to 3 hours or after sweating, drying off with a towel, or swimming. Always wear a seat belt when traveling in a car. Always wear a helmet when riding a bicycle or motorcycle.

## 2022-10-20 NOTE — PROGRESS NOTES
Diana Echeverria M.D.  [address] [facility]  Kamari Rivero  Phone:  [phone]  Fax:  [phone]        Medicare Annual Wellness Visit    I was in the office while conducting this encounter. The patient was at home. CHIEF COMPLAINT:  Chief Complaint   Patient presents with    Medicare AWV     Here for wellness exam, patient would like to come in for labs. Paulino Phillips is here for [employer] (Here for wellness exam, patient would like to come in for labs. )  She has been doing well. She has been controlling her diabetes with Metformin. The highest h as been in the 150s. Her A1c has decreased from 9.1 to 6.0. She also has been losing weight. She is currently 190 lbs. She feels good. Assessment & Plan   Medicare annual wellness visit, subsequent  Type 2 diabetes mellitus with diabetic neuropathy, without long-term current use of insulin ([address]  -     CBC with Auto Differential; Future  -     Comprehensive Metabolic Panel; Future  -     Hemoglobin A1C; Future  Essential hypertension  -     CBC with Auto Differential; Future  -     Comprehensive Metabolic Panel; Future  Vitamin D deficiency  -     Vitamin D 25 Hydroxy; Future  Screening for lipid disorders  -     Lipid Panel; Future  Need for shingles vaccine  -     zoster recombinant adjuvanted vaccine [facility]) 50 MCG/0.5ML SUSR injection; Inject 0.5 mLs into the muscle See Admin Instructions 1 dose now and repeat in 2-6 months, Disp-0.5 mL, R-0Normal  Need for vaccination for DTaP  -     Tetanus-Diphth-Acell Pertussis ([address]) 5-2.5-18.5 LF-MCG/0.5 injection;  Inject 0.5 mLs into the muscle once for 1 dose, Disp-1 each, R-0Normal  Need for pneumococcal vaccination  -     pneumococcal 20-valent conjugat (PREVNAR 20) 0.5 ML JULIÁN inj; Inject 0.5 mLs into the muscle once for 1 dose, Disp-0.5 mL, R-0Normal  Encounter for long-term (current) use of medications  -     CBC with Auto Differential; Future  - Comprehensive Metabolic Panel; Future    Recommendations for Preventive Services Due: see orders and patient instructions/AVS.  Recommended screening schedule for the next 5-10 years is provided to the patient in written form: see Patient Instructions/AVS.     Return in about 4 months (around 2/20/2023). Subjective       Patient's complete Health Risk Assessment and screening values have been reviewed and are found in Flowsheets. The following problems were reviewed today and where indicated follow up appointments were made and/or referrals ordered.     Positive Risk Factor Screenings with Interventions:    Fall Risk:  Do you feel unsteady or are you worried about falling? : no  2 or more falls in past year?: (!) yes  Fall with injury in past year?: (!) yes   Fall Risk Interventions:    --------------------------------------------------------------------------------        Drug Use Screening:    DAST-10 Score Interpretation:  1-2: Low level - Monitor, re-assess at a later date; 3-5: Moderate level - Further Investigation; 6-8: Substantial level - Intensive Assessment; 9-10: Severe level - Intensive Assessment  Substance Use - Drug Use Interventions:  --------------------------------------------------------------------------------       General Health and ACP:  General  In general, how would you say your health is?: Good  In the past 7 days, have you experienced any of the following: New or Increased Pain, New or Increased Fatigue, Loneliness, Social Isolation, Stress or Anger?: No  Do you get the social and emotional support that you need?: (!) No  Do you have a Living Will?: (!) No    Advance Directives       Power of  Living Will ACP-Advance Directive ACP-Power of     Not on File Not on File Not on File Not on File        General Health Risk Interventions:  none    Health Habits/Nutrition:  Physical Activity: Sufficiently Active    Days of Exercise per Week: 7 days    Minutes of Exercise per Session: 30 min     Have you lost any weight without trying in the past 3 months?: (!) Yes     Have you seen the dentist within the past year?: Yes  Health Habits/Nutrition Interventions:  none     Safety:  Do you have working smoke detectors?: (!) No  Do you have any tripping hazards - loose or unsecured carpets or rugs?: No  Do you have any tripping hazards - clutter in doorways, halls, or stairs?: No  Do you have either shower bars, grab bars, non-slip mats or non-slip surfaces in your shower or bathtub?: Yes  Do all of your stairways have a railing or banister?: (!) No  Do you always fasten your seatbelt when you are in a car?: Yes  Safety Interventions:  --------------------------------------------------------------------------------           Objective      Patient-Reported Vitals  Patient-Reported Weight: 190lbs  Patient-Reported Height: 65.5in     General Appearance: alert and oriented to person, place and time, well developed and well- nourished, in no acute distress  Skin: warm and dry, no rash or erythema  Head: normocephalic and atraumatic  Eyes: pupils equal, round, and reactive to light, extraocular eye movements intact, conjunctivae normal  ENT: tympanic membrane, external ear and ear canal normal bilaterally, nose without deformity, nasal mucosa and turbinates normal without polyps  Neck: supple and non-tender without mass, no thyromegaly or thyroid nodules, no cervical lymphadenopathy  Pulmonary/Chest: clear to auscultation bilaterally- no wheezes, rales or rhonchi, normal air movement, no respiratory distress  Cardiovascular: normal rate, regular rhythm, normal S1 and S2, no murmurs, rubs, clicks, or gallops, distal pulses intact, no carotid bruits  Abdomen: soft, non-tender, non-distended, normal bowel sounds, no masses or organomegaly  Extremities: no cyanosis, clubbing or edema  Musculoskeletal: normal range of motion, no joint swelling, deformity or tenderness  Neurologic: reflexes normal and symmetric, no cranial nerve deficit, gait, coordination and speech normal       No Known Allergies  Prior to Visit Medications    Medication Sig Taking? Authorizing Provider   Alcohol Swabs (ALCOHOL PADS) 70 % PADS CLEAN THE SKIN BY USING 1 PAD ON THE AFFECTED AREA BEFORE APPLYING ANY TOPICAL CREAM, 3 TIMES DAILY Yes Historical Provider, MD   pneumococcal 20-valent conjugat (PREVNAR 20) 0.5 ML JULIÁN inj Inject 0.5 mLs into the muscle once for 1 dose Yes Adolfo Villanueva MD   Tetanus-Diphth-Acell Pertussis (BOOSTRIX) 5-2.5-18.5 LF-MCG/0.5 injection Inject 0.5 mLs into the muscle once for 1 dose Yes Adolfo Villanueva MD   zoster recombinant adjuvanted vaccine [facility]) 50 MCG/0.5ML SUSR injection Inject 0.5 mLs into the muscle See Admin Instructions 1 dose now and repeat in 2-6 months Yes Adolfo Villanueva MD   metFORMIN (GLUCOPHAGE-XR) 500 MG extended release tablet TAKE 1 TABLET BY MOUTH DAILY WITH DINNER FOR DIABETES Yes Adolfo Villanueva MD   traMADol (ULTRAM) 50 MG tablet TAKE 1 TABLET BY MOUTH EVERY 6 HOURS AS NEEDED FOR PAIN. MAX DAILY AMOUNT: 200 MG.  INDICATIONS: PAIN Yes Adolfo Villanueva MD   meloxicam (MOBIC) 7.5 MG tablet TAKE 1 TABLET BY MOUTH TWICE DAILY Yes Adlofo Villanueva MD   Blood Glucose Monitoring Suppl (ONE TOUCH ULTRA 2) w/Device KIT Use to check blood sugars twice daily Yes Adolfo Villanueva MD   Lancets MISC 1 each by Does not apply route 2 times daily Yes Adolfo Villanueva MD   amLODIPine (NORVASC) 10 MG tablet TAKE 1 TABLET BY MOUTH DAILY FOR HIGH BLOOD PRESSURE Yes Ar Automatic Reconciliation   butalbital-acetaminophen-caffeine (FIORICET, ESGIC) -40 MG per tablet Take 1 tablet by mouth daily as needed Yes Ar Automatic Reconciliation   vitamin D (CHOLECALCIFEROL) 25 MCG (1000 UT) TABS tablet Take 1,000 Units by mouth daily Yes Ar Automatic Reconciliation   citalopram (CELEXA) 20 MG tablet Take 20 mg by mouth daily Yes Ar Automatic Reconciliation   cyanocobalamin 1000 MCG tablet Take 1,000 mcg by mouth daily Yes Ar Automatic Reconciliation   gabapentin (NEURONTIN) 300 MG capsule TAKE 1 CAPSULE BY MOUTH THREE TIMES DAILY AS NEEDED FOR PAIN Yes Ar Automatic Reconciliation   pravastatin (PRAVACHOL) 40 MG tablet Take 40 mg by mouth Yes Ar Automatic Reconciliation   blood glucose test strips (ASCENSIA AUTODISC VI;ONE TOUCH ULTRA TEST VI) strip 1 each by In Vitro route 2 times daily As needed. Marcello Garcia MD   acetaminophen (TYLENOL) 500 MG tablet Take 1,000 mg by mouth every 6 hours as needed  Ar Automatic Reconciliation       CareTeam (Including outside providers/suppliers regularly involved in providing care):   Patient Care Team:  Marcello Garcia MD as PCP - [place] MD Estuardo as PCP - [facility]     Reviewed and updated this visit:  Tobacco  Allergies  Meds  Problems  Med Hx  Surg Hx  Soc Hx  Fam Hx            Hazeline Christianeisaías Loco, was evaluated through a synchronous (real-time) audio-video encounter. The patient (or guardian if applicable) is aware that this is a billable service, which includes applicable co-pays. This Virtual Visit was conducted with patient's (and/or legal guardian's) consent. The visit was conducted pursuant to the emergency declaration under the [address] [place] [address], [address] [place] [address] authority and the [employer] Act. Patient identification was verified, and a caregiver was present when appropriate. The patient was located at Home: [facility] [address] Provider was located at [facility] ([address] [place] [address]): Jenna [address] [place] [address] 14.. Dictated using voice recognition software.  Proofread, but unrecognized voice recognition errors may exist.    Kathe Benitez MD  10/20/22      Cardiovascular Disease Risk Counseling: Assessed the

## 2022-10-26 RX ORDER — NIACIN 500 MG
500 TABLET ORAL
COMMUNITY
End: 2022-10-27 | Stop reason: SDUPTHER

## 2022-10-26 RX ORDER — LIDOCAINE HYDROCHLORIDE 40 MG/ML
1 SOLUTION TOPICAL PRN
COMMUNITY
End: 2022-10-27 | Stop reason: SDUPTHER

## 2022-10-26 NOTE — TELEPHONE ENCOUNTER
----- Message from [place] sent at 10/26/2022  3:30 PM EDT -----  Subject: Refill Request    QUESTIONS  Name of Medication? Other - Lidocaine HCL 4percent solution   Patient-reported dosage and instructions? Applied on area, four sprays   How many days do you have left? Unknown  Preferred Pharmacy? [facility] phone number (if available)? [phone]  ---------------------------------------------------------------------------  --------------,  Name of Medication? Other - Niacin   Patient-reported dosage and instructions? 500 MG, once daily   How many days do you have left? Unknown  Preferred Pharmacy? [facility] phone number (if available)? [phone]  Additional Information for Provider? Glenna Senior is calling from [address] [place] [address]   to request these medications for pt. Please call [phone]. Fax number   is [phone]  ---------------------------------------------------------------------------  --------------  CALL BACK INFO  What is the best way for the office to contact you? OK to leave message on   voicemail  Preferred Call Back Phone Number? [phone]  ---------------------------------------------------------------------------  --------------  SCRIPT ANSWERS  Relationship to Patient? Third Party  Third Party Type? Pharmacy? Representative Name?  Glenna Senior

## 2022-10-27 RX ORDER — NIACIN 500 MG
500 TABLET ORAL
Qty: 30 TABLET | Refills: 5 | Status: SHIPPED | OUTPATIENT
Start: 2022-10-27

## 2022-10-27 RX ORDER — LIDOCAINE HYDROCHLORIDE 40 MG/ML
1 SOLUTION TOPICAL PRN
Qty: 50 ML | Refills: 5 | Status: SHIPPED | OUTPATIENT
Start: 2022-10-27 | End: 2022-10-27

## 2022-10-27 RX ORDER — LIDOCAINE HYDROCHLORIDE 40 MG/ML
SOLUTION TOPICAL
Qty: 400 ML | Refills: 5 | Status: SHIPPED | OUTPATIENT
Start: 2022-10-27

## 2022-11-28 DIAGNOSIS — G89.29 CHRONIC PAIN OF LEFT KNEE: ICD-10-CM

## 2022-11-28 DIAGNOSIS — M25.562 CHRONIC PAIN OF LEFT KNEE: ICD-10-CM

## 2022-11-28 RX ORDER — TRAMADOL HYDROCHLORIDE 50 MG/1
50 TABLET ORAL EVERY 6 HOURS PRN
Qty: 60 TABLET | Refills: 1 | Status: SHIPPED | OUTPATIENT
Start: 2022-11-28 | End: 2022-12-28

## 2022-12-10 RX ORDER — AMLODIPINE BESYLATE 10 MG/1
TABLET ORAL
Qty: 90 TABLET | Refills: 3 | Status: SHIPPED | OUTPATIENT
Start: 2022-12-10

## 2022-12-21 ENCOUNTER — TELEPHONE (OUTPATIENT)
Dept: PRIMARY CARE CLINIC | Facility: CLINIC | Age: 72
End: 2022-12-21

## 2022-12-23 RX ORDER — ALBUTEROL SULFATE 90 UG/1
2 AEROSOL, METERED RESPIRATORY (INHALATION) EVERY 4 HOURS PRN
Qty: 18 G | Refills: 5 | Status: SHIPPED | OUTPATIENT
Start: 2022-12-23

## 2023-01-26 DIAGNOSIS — M25.562 CHRONIC PAIN OF LEFT KNEE: ICD-10-CM

## 2023-01-26 DIAGNOSIS — G89.29 CHRONIC PAIN OF LEFT KNEE: ICD-10-CM

## 2023-01-31 ENCOUNTER — TELEPHONE (OUTPATIENT)
Dept: PRIMARY CARE CLINIC | Facility: CLINIC | Age: 73
End: 2023-01-31

## 2023-01-31 DIAGNOSIS — G89.29 CHRONIC PAIN OF LEFT KNEE: ICD-10-CM

## 2023-01-31 DIAGNOSIS — M25.562 CHRONIC PAIN OF LEFT KNEE: ICD-10-CM

## 2023-01-31 NOTE — LETTER
CONTROLLED SUBSTANCE MEDICATION AGREEMENT     Patient Name: Tressa Andrea  Patient YOB: 1950   I understand, that controlled substance medications may be used to help better manage my symptoms and to improve my ability to function at home, work and in social settings. However, I also understand that these medications do have risks, which have been discussed with me, including possible development of physical or psychological dependence. I understand that successful treatment requires mutual trust and honesty between me and my provider. I understand and agree that following this Medication Agreement is necessary in continuing my provider-patient relationship and the success of my treatment plan. Explanation from my Provider: Benefits and Goals of Controlled Substance Medications: There are two potential goals for your treatment: (1) decreased pain and suffering (2) improved daily life functions. There are many possible treatments for your chronic condition(s). Alternatives such as physical therapy, yoga, massage, home daily exercise, meditation, relaxation techniques, injections, chiropractic manipulations, surgery, cognitive therapy, hypnosis and many medications that are not habit-forming may be used. Use of controlled substance medications may be helpful, but they are unlikely to resolve all symptoms or restore all function. Explanation from my Provider: Risks of Controlled Substance Medications:  Opioid pain medications: These medications can lead to problems such as addiction/dependence, sedation, lightheadedness/dizziness, memory issues, falls, constipation, nausea, or vomiting. They may also impair the ability to drive or operate machinery. Additionally, these medications may lower testosterone levels, leading to loss of bone strength, stamina and sex drive.   They may cause problems with breathing, sleep apnea and reduced coughing, which is especially dangerous for patients with lung disease. Overdose or dangerous interactions with alcohol and other medications may occur, leading to death. Hyperalgesia may develop, which means patients receiving opioids for the treatment of pain may become more sensitive to certain painful stimuli, and in some cases, experience pain from ordinarily non-painful stimuli. Women between the ages of 14-53 who could become pregnant should carefully weigh the risks and benefits of opioids with their physicians, as these medications increase the risk of pregnancy complications, including miscarriage,  delivery and stillbirth. It is also possible for babies to be born addicted to opioids. Opioid dependence withdrawal symptoms may include; feelings of uneasiness, increased pain, irritability, belly pain, diarrhea, sweats and goose-flesh. Benzodiazepines and non-benzodiazepine sleep medications: These medications can lead to problems such as addiction/dependence, sedation, fatigue, lightheadedness, dizziness, incoordination, falls, depression, hallucinations, and impaired judgment, memory and concentration. The ability to drive and operate machinery may also be affected. Abnormal sleep-related behaviors have been reported, including sleepwalking, driving, making telephone calls, eating, or having sex while not fully awake. These medications can suppress breathing and worsen sleep apnea, particularly when combined with alcohol or other sedating medications, potentially leading to death. Dependence withdrawal symptoms may include tremors, anxiety, hallucinations and seizures. Stimulants:  Common adverse effects include addiction/dependence, increased blood  pressure and heart rate, decreased appetite, nausea, involuntary weight loss, insomnia,                                                                                                                     Initials:_______   irritability, and headaches.   These risks may increase when these medications are combined with other stimulants, such as caffeine pills or energy drinks, certain weight loss supplements and oral decongestants. Dependence withdrawal symptoms may include depressed mood, loss of interest, suicidal thoughts, anxiety, fatigue, appetite changes and agitation. Testosterone replacement therapy:  Potential side effects include increased risk of stroke and heart attack, blood clots, increased blood pressure, increased cholesterol, enlarged prostate, sleep apnea, irritability/aggression and other mood disorders, and decreased fertility. I agree and understand that I and my prescriber have the following rights and responsibilities regarding my treatment plan:     1. MY RIGHTS:  To be informed of my treatment and medication plan. To be an active participant in my health and wellbeing. 2. MY RESPONSIBILITY AND UNDERSTANDING FOR USE OF MEDICATIONS   I will take medications at the dose and frequency as directed. For my safety, I will not increase or change how I take my medications without the recommendation of my healthcare provider.  I will actively participate in any program recommended by my provider which may improve function, including social, physical, psychological programs.  I will not take my medications with alcohol or other drugs not prescribed to me. I understand that drinking alcohol with my medications increases the chances of side effects, including reduced breathing rate and could lead to personal injury when operating machinery.  I understand that if I have a history of substance use disorders, including alcohol or other illicit drugs, that I may be at increased risk of addiction to my medications.  I agree to notify my provider immediately if I should become pregnant so that my treatment plan can be adjusted.    I agree and understand that I shall only receive controlled substance medications from the prescriber that signed this agreement unless there is written agreement among other prescribers of controlled substances outlining the responsibility of the medications being prescribed.  I understand that the if the controlled medication is not helping to achieve goals, the dosage may be tapered and no longer prescribed. 3. MY RESPONSIBILITY FOR COMMUNICATION / PRESCRIPTION RENEWALS   I agree that all controlled substance medications that I take will be prescribed only by my provider. If another healthcare provider prescribes me medication in an emergency, I will notify my provider within seventy-two (72) hours.  I will arrange for refills at the prescribed interval ONLY during regular office hours. I will not ask for refills earlier than agreed, after-hours, on holidays or weekends. Refills may take up to 72 hours for processing and prescriptions to reach the pharmacy.  I will inform my other health care providers that I am taking these medications and of the existence of this [id number]. In the event of an emergency, I will provide the same information to the emergency department prescribers.  I will keep my provider updated on the pharmacy I am using for controlled medication prescription filling. Initials:_______  4. MY RESPONSIBILITY FOR PROTECTING MEDICATIONS   I will protect my prescriptions and medications. I understand that lost or misplaced prescriptions will not be replaced.  I will keep medications only for my own use and will not share them with others. I will keep all medications away from children.  I agree that if my medications are adjusted or discontinued, I will properly dispose of any remaining medications. I understand that I will be required to dispose of any remaining controlled medications as, directed by my prescriber, prior to being provided with any prescriptions for other controlled medications.   Medication drop box locations can be found at: [email or web address]    5. MY RESPONSIBILITY WITH ILLEGAL DRUGS    I will not use illegal or street drugs or another person's prescription medications not prescribed to me.  If there are identified addiction type symptoms, then referral to a program may be provided by my provider and I agree to follow through with this recommendation. 6. MY RESPONSIBILITY FOR COOPERATION WITH INVESTIGATIONS   I understand that my provider will comply with any applicable law and may discuss my use and/or possible misuse/abuse of controlled substances and alcohol, as appropriate, with any health care provider involved in my care, pharmacist, or legal authority.  I authorize my provider and pharmacy to cooperate fully with law enforcement agencies (as permitted by law) in the investigation of any possible misuse, sale, or other diversion of my controlled substances.  I agree to waive any applicable privilege or right of privacy or confidentiality with respect to these authorizations. 7. PROVIDERS RIGHT TO MONITOR FOR SAFETY: PRESCRIPTION MONITORING / DRUG TESTING   I consent to drug/toxicology screening and will submit to a drug screen upon my providers request to assure I am only taking the prescribed drugs for my safety monitoring. I understand that a drug screen is a laboratory test in which a sample of my urine, blood or saliva is checked to see what drugs I have been taking. This may entail an observed urine specimen, which means that a nurse or other health care provider may watch me provide urine, and I will cooperate if I am asked to provide an observed specimen.  I understand that my provider will check a copy of my State Prescription Monitoring Program () Report in order to safely prescribe medications.  Pill Counts: I consent to pill counts when requested.   I may be asked to bring all my prescribed controlled substance medications, in their original bottles, to all of my scheduled appointments. In addition, my provider may ask me to come to the practice at any time for a random pill count. 8. TERMINATION OF THIS AGREEMENT  For my safety, my prescriber has the right to stop prescribing controlled substance medications and may end this agreement. Initials:_______   Conditions that may result in termination of this agreement:  a. I do not show any improvement in pain, or my activity has not improved. b. I develop rapid tolerance or loss of improvement, as described in my treatment plan.  c. I develop significant side effects from the medication. d. My behavior is not consistent with the responsibilities outlined above, thereby causing safety concerns to continue prescribing controlled substance medications. e. I fail to follow the terms of this agreement. f. Other:____________________________       UNDERSTANDING THIS MEDICATION AGREEMENT:    I have read the above and have had all my questions answered. For chronic disease management, I know that my symptoms can be managed with many types of treatments. A chronic medication trial may be part of my treatment, but I must be an active participant in my care. Medication therapy is only one part of my symptom management plan. In some cases, there may be limited scientific evidence to support the chronic use of certain medications to improve symptoms and daily function. Furthermore, in certain circumstances, there may be scientific information that suggests that the use of chronic controlled substances may worsen my symptoms and increase my risk of unintentional death directly related to this medication therapy. I know that if my provider feels my risk from controlled medications is greater than my benefit, I will have my controlled substance medication(s) compassionately lowered or removed altogether.      I further agree to allow this office to contact my HIPAA contact if there are concerns about my safety and use of the controlled medications. I have agreed to use the prescribed controlled substance medications to me as instructed by my provider and as stated in this Medication Agreement. My initial on each page and my signature below shows that I have read each page and I have had the opportunity to ask questions with answers provided by my provider. Patient Name (Printed): __Kusum Riley___  Patient Signature:  ______________________   Date: _____________    Prescriber Name (Printed): __Dr. Stephanie Zamorano___  Prescriber Signature: _____________________  Date: __2/2/2023___

## 2023-02-02 RX ORDER — TRAMADOL HYDROCHLORIDE 50 MG/1
50 TABLET ORAL EVERY 6 HOURS PRN
Qty: 60 TABLET | Refills: 0 | Status: SHIPPED | OUTPATIENT
Start: 2023-02-02 | End: 2023-03-04

## 2023-02-05 RX ORDER — TRAMADOL HYDROCHLORIDE 50 MG/1
TABLET ORAL
Qty: 60 TABLET | OUTPATIENT
Start: 2023-02-05

## 2023-02-20 ENCOUNTER — TELEMEDICINE (OUTPATIENT)
Dept: PRIMARY CARE CLINIC | Facility: CLINIC | Age: 73
End: 2023-02-20
Payer: MEDICARE

## 2023-02-20 DIAGNOSIS — M25.562 CHRONIC PAIN OF LEFT KNEE: ICD-10-CM

## 2023-02-20 DIAGNOSIS — Z83.49 FAMILY HISTORY OF THYROID DISEASE: ICD-10-CM

## 2023-02-20 DIAGNOSIS — E11.40 TYPE 2 DIABETES MELLITUS WITH DIABETIC NEUROPATHY, WITHOUT LONG-TERM CURRENT USE OF INSULIN (HCC): Primary | ICD-10-CM

## 2023-02-20 DIAGNOSIS — Z13.220 SCREENING FOR LIPID DISORDERS: ICD-10-CM

## 2023-02-20 DIAGNOSIS — G89.29 CHRONIC PAIN OF LEFT KNEE: ICD-10-CM

## 2023-02-20 DIAGNOSIS — I10 ESSENTIAL HYPERTENSION: ICD-10-CM

## 2023-02-20 DIAGNOSIS — M25.561 CHRONIC PAIN OF RIGHT KNEE: ICD-10-CM

## 2023-02-20 DIAGNOSIS — E55.9 VITAMIN D DEFICIENCY: ICD-10-CM

## 2023-02-20 DIAGNOSIS — Z79.899 ENCOUNTER FOR LONG-TERM (CURRENT) USE OF MEDICATIONS: ICD-10-CM

## 2023-02-20 DIAGNOSIS — K21.9 GASTROESOPHAGEAL REFLUX DISEASE WITHOUT ESOPHAGITIS: ICD-10-CM

## 2023-02-20 DIAGNOSIS — F33.0 MAJOR DEPRESSIVE DISORDER, RECURRENT, MILD (HCC): ICD-10-CM

## 2023-02-20 DIAGNOSIS — G89.29 CHRONIC PAIN OF RIGHT KNEE: ICD-10-CM

## 2023-02-20 PROCEDURE — 1123F ACP DISCUSS/DSCN MKR DOCD: CPT | Performed by: FAMILY MEDICINE

## 2023-02-20 PROCEDURE — 99214 OFFICE O/P EST MOD 30 MIN: CPT | Performed by: FAMILY MEDICINE

## 2023-02-20 RX ORDER — METHOCARBAMOL 500 MG/1
TABLET, FILM COATED ORAL
COMMUNITY
Start: 2023-01-18

## 2023-02-20 RX ORDER — NALOXONE HYDROCHLORIDE 4 MG/.1ML
SPRAY NASAL
COMMUNITY
Start: 2023-01-18

## 2023-02-20 RX ORDER — DICLOFENAC SODIUM 75 MG/1
TABLET, DELAYED RELEASE ORAL
COMMUNITY
Start: 2023-01-18

## 2023-02-20 RX ORDER — PANTOPRAZOLE SODIUM 40 MG/1
40 TABLET, DELAYED RELEASE ORAL
Qty: 90 TABLET | Refills: 3 | Status: SHIPPED | OUTPATIENT
Start: 2023-02-20

## 2023-02-20 NOTE — PROGRESS NOTES
Danielle De La Torre M.D.  [address] [facility]  Phone:  [phone]  Fax:  [phone]          I was in the office while conducting this encounter. The patient was at home. CHIEF COMPLAINT:  Chief Complaint   Patient presents with    Gastroesophageal Reflux     She has been having problems with reflux. Diabetes     Her blood sugars have been under control. Knee Pain     She continues to have bilateral knee pain. She goes to the orthopedist.             HISTORY OF PRESENT ILLNESS:  Ms. Carmen Villa is a 81 y.o. female  who presents for follow up. She has been having problems with reflux. This has been going on for about 2 to 3 months. She has been taking TUMS without relief. She denies any blood in her stools or black stools. Her blood sugars have been under control. Her last A1c was 6.0. She has been taking Metformin  mg daily. She is wondering if she can stop the medication and try diet alone. She continues to have bilateral knee pain. She goes to the orthopedist. Her pain has been under control. No other complaints. Taking medications as prescribed. Medications reviewed and updated. HISTORY:  No Known Allergies      REVIEW OF SYSTEMS:  Review of systems is as indicated in HPI otherwise negative.     PHYSICAL EXAM:    Vital Signs: (As obtained by patient/caregiver at home)  Patient-Reported Vitals 10/20/2022   Patient-Reported Weight 190lbs   Patient-Reported Height 65.5in           PHQ:  PHQ-9  6/14/2022   Little interest or pleasure in doing things 0   Little interest or pleasure in doing things -   Feeling down, depressed, or hopeless 0   Trouble falling or staying asleep, or sleeping too much -   Feeling tired or having little energy -   Poor appetite or overeating -   Feeling bad about yourself - or that you are a failure or have let yourself or your family down -   Trouble concentrating on things, such as reading the newspaper or watching television -   Moving or speaking so slowly that other people could have noticed. Or the opposite - being so fidgety or restless that you have been moving around a lot more than usual -   Thoughts that you would be better off dead, or of hurting yourself in some way -   PHQ-2 Score 0   Total Score PHQ 2 -   PHQ-9 Total Score 0   If you checked off any problems, how difficult have these problems made it for you to do your work, take care of things at home, or get along with other people? -       LABS  No results found for this visit on 02/20/23.   Orders Only on 08/04/2022   Component Date Value Ref Range Status    TSH, 3RD GENERATION 08/04/2022 0.747  0.358 - 3.740 uIU/mL Final    T4 Free 08/04/2022 1.0  0.78 - 1.46 NG/DL Final    Vit D, 25-Hydroxy 08/04/2022 25.8 (A)  30.0 - 100.0 ng/mL Final    Hemoglobin A1C 08/04/2022 6.0 (A)  4.8 - 5.6 % Final    eAG 08/04/2022 126  mg/dL Final    Comment: Reference Range  Normal: 4.8-5.6  Diabetic >=6.5%  Normal       <5.7%      Cholesterol, Total 08/04/2022 144  <200 MG/DL Final    Comment: Borderline High: 200-239 mg/dL  High: Greater than or equal to 240 mg/dL      Triglycerides 08/04/2022 101  35 - 150 MG/DL Final    Comment: Borderline High: 150-199 mg/dL, High: 200-499 mg/dL  Very High: Greater than or equal to 500 mg/dL      HDL 08/04/2022 54  40 - 60 MG/DL Final    LDL Calculated 08/04/2022 69.8  <100 MG/DL Final    Comment: Near Optimal: 100-129 mg/dL  Borderline High: 130-159, High: 160-189 mg/dL  Very High: Greater than or equal to 190 mg/dL      VLDL Cholesterol Calculated 08/04/2022 20.2  6.0 - 23.0 MG/DL Final    Chol/HDL Ratio 08/04/2022 2.7    Final    Sodium 08/04/2022 139  136 - 145 mmol/L Final    Potassium 08/04/2022 4.4  3.5 - 5.1 mmol/L Final    Chloride 08/04/2022 107  98 - 107 mmol/L Final    CO2 08/04/2022 27  21 - 32 mmol/L Final    Anion Gap 08/04/2022 5 (A)  7 - 16 mmol/L Final    Glucose 08/04/2022 130 (A)  65 - 100 mg/dL Final    BUN 08/04/2022 12  8 - 23 MG/DL Final    Creatinine 08/04/2022 0.70  0.6 - 1.0 MG/DL Final    GFR  08/04/2022 >60  >60 ml/min/1.73m2 Final    GFR Non- 08/04/2022 >60  >60 ml/min/1.73m2 Final    Comment:   Estimated GFR is calculated using the Modification of Diet in Renal Disease (MDRD) Study equation, reported for both  Americans (GFRAA) and non- Americans (GFRNA), and normalized to 1.73m2 body surface area. The physician must decide which value applies to the patient. The MDRD study equation should only be used in individuals age 25 or older. It has not been validated for the following: pregnant women, patients with serious comorbid conditions,or on certain medications, or persons with extremes of body size, muscle mass, or nutritional status.       Calcium 08/04/2022 9.3  8.3 - 10.4 MG/DL Final    Total Bilirubin 08/04/2022 0.6  0.2 - 1.1 MG/DL Final    ALT 08/04/2022 15  12 - 65 U/L Final    AST 08/04/2022 12 (A)  15 - 37 U/L Final    Alk Phosphatase 08/04/2022 73  50 - 136 U/L Final    Total Protein 08/04/2022 7.0  6.3 - 8.2 g/dL Final    Albumin 08/04/2022 3.9  3.2 - 4.6 g/dL Final    Globulin 08/04/2022 3.1  2.3 - 3.5 g/dL Final    Albumin/Globulin Ratio 08/04/2022 1.3  1.2 - 3.5   Final    WBC 08/04/2022 7.2  4.3 - 11.1 K/uL Final    RBC 08/04/2022 4.18  4.05 - 5.2 M/uL Final    Hemoglobin 08/04/2022 12.9  11.7 - 15.4 g/dL Final    Hematocrit 08/04/2022 41.7  35.8 - 46.3 % Final    MCV 08/04/2022 99.8 (A)  79.6 - 97.8 FL Final    MCH 08/04/2022 30.9  26.1 - 32.9 PG Final    MCHC 08/04/2022 30.9 (A)  31.4 - 35.0 g/dL Final    RDW 08/04/2022 12.7  11.9 - 14.6 % Final    Platelets 47/75/5148 313  150 - 450 K/uL Final    MPV 08/04/2022 12.1  9.4 - 12.3 FL Final    nRBC 08/04/2022 0.00  0.0 - 0.2 K/uL Final    **Note: Absolute NRBC parameter is now reported with Hemogram**    Differential Type 08/04/2022 AUTOMATED    Final    Seg Neutrophils 08/04/2022 68  43 - 78 % Final Lymphocytes 08/04/2022 25  13 - 44 % Final    Monocytes 08/04/2022 4  4.0 - 12.0 % Final    Eosinophils % 08/04/2022 2  0.5 - 7.8 % Final    Basophils 08/04/2022 1  0.0 - 2.0 % Final    Immature Granulocytes 08/04/2022 0  0.0 - 5.0 % Final    Segs Absolute 08/04/2022 4.9  1.7 - 8.2 K/UL Final    Absolute Lymph # 08/04/2022 1.8  0.5 - 4.6 K/UL Final    Absolute Mono # 08/04/2022 0.3  0.1 - 1.3 K/UL Final    Absolute Eos # 08/04/2022 0.2  0.0 - 0.8 K/UL Final    Basophils Absolute 08/04/2022 0.1  0.0 - 0.2 K/UL Final    Absolute Immature Granulocyte 08/04/2022 0.0  0.0 - 0.5 K/UL Final       IMPRESSION/PLAN     Diagnosis Orders   1. Type 2 diabetes mellitus with diabetic neuropathy, without long-term current use of insulin (HCC)  CBC with Auto Differential    Comprehensive Metabolic Panel    Hemoglobin A1C      2. Major depressive disorder, recurrent, mild ([facility] [address]        3. Essential hypertension  CBC with Auto Differential    Comprehensive Metabolic Panel      4. Gastroesophageal reflux disease without esophagitis  pantoprazole (PROTONIX) 40 MG tablet      5. Chronic pain of left knee        6. Chronic pain of right knee        7. Vitamin D deficiency  Vitamin D 25 Hydroxy      8. Screening for lipid disorders  Lipid Panel      9. Family history of thyroid disease  TSH      10. Encounter for long-term (current) use of medications  CBC with Auto Differential    Comprehensive Metabolic Panel          Follow up and Dispositions:  Return in about 4 months (around 6/20/2023) for [address] [place] [address]. Patient will continue current medications. Will add the following medications: Protonix 40 mg daily for GERD. Reviewed medications and side effects in detail. Will check the above labs. Reviewed most recent labs. Reviewed diet, exercise and weight control. Cardiovascular risks and recommendations reviewed. Patient encouraged to follow a diabetic/low sodium diet.   Patient will check blood sugars once daily. Consent: This patient and/or their healthcare decision maker is aware that this patient-initiated Telehealth encounter is a billable service, with coverage as determined by their insurance carrier. Patient is aware that they may receive a bill and has provided verbal consent to proceed: Yes     The patient is being evaluated by a Virtual Visit (video visit) encounter to address concerns as mentioned above. A caregiver was present when appropriate. Due to this being a TeleHealth encounter (During [address] emergency), evaluation of the following organ systems was limited: Vitals/Constitutional/EENT/Resp/CV/GI//MS/Neuro/Skin/Heme-Lymph-Imm. Pursuant to the emergency declaration under the [address] [place] [address], [address] [place] [address] waiver authority and the [employer] Act, this Virtual Visit was conducted with patient's (and/or legal guardian's) consent, to reduce the patient's risk of exposure to COVID-19 and provide necessary medical care. The patient (and/or legal guardian) has also been advised to contact this office for worsening conditions or problems, and seek emergency medical treatment and/or call 911 if deemed necessary. Patient identification was verified at the start of the visit: YES    Services were provided through a video synchronous discussion virtually to substitute for in-person clinic visit. Patient and provider were located at their individual homes. Melonie Hahn, was evaluated through a synchronous (real-time) audio-video encounter. The patient (or guardian if applicable) is aware that this is a billable service, which includes applicable co-pays. This Virtual Visit was conducted with patient's (and/or legal guardian's) consent.  The visit was conducted pursuant to the emergency declaration under the [address] [address] waiver authority and the Coronavirus Preparedness and Response Supplemental Appropriations Act. Patient identification was verified, and a caregiver was present when appropriate. The patient was located at [facility] [address]  Provider was located at [facility]  [address]        Total Time: minutes: 21-30 minutes. Dictated using voice recognition software.  Proofread, but unrecognized voice recognition errors may exist.    Carmela Maynard MD  02/20/23

## 2023-02-26 RX ORDER — BUTALBITAL, ACETAMINOPHEN AND CAFFEINE 50; 325; 40 MG/1; MG/1; MG/1
TABLET ORAL
Qty: 30 TABLET | Refills: 3 | Status: SHIPPED | OUTPATIENT
Start: 2023-02-26

## 2023-03-08 DIAGNOSIS — M25.562 CHRONIC PAIN OF LEFT KNEE: ICD-10-CM

## 2023-03-08 DIAGNOSIS — G89.29 CHRONIC PAIN OF LEFT KNEE: ICD-10-CM

## 2023-03-09 ENCOUNTER — TELEPHONE (OUTPATIENT)
Dept: PRIMARY CARE CLINIC | Facility: CLINIC | Age: 73
End: 2023-03-09

## 2023-03-09 DIAGNOSIS — G89.29 CHRONIC PAIN OF LEFT KNEE: ICD-10-CM

## 2023-03-09 DIAGNOSIS — M25.562 CHRONIC PAIN OF LEFT KNEE: ICD-10-CM

## 2023-03-09 RX ORDER — TRAMADOL HYDROCHLORIDE 50 MG/1
50 TABLET ORAL EVERY 6 HOURS PRN
Qty: 60 TABLET | Refills: 0 | Status: SHIPPED | OUTPATIENT
Start: 2023-03-09 | End: 2023-04-08

## 2023-03-09 RX ORDER — PRAVASTATIN SODIUM 40 MG
TABLET ORAL
Qty: 90 TABLET | Refills: 3 | Status: SHIPPED | OUTPATIENT
Start: 2023-03-09

## 2023-03-09 NOTE — TELEPHONE ENCOUNTER
Romelle Apgar called into the office requesting a rx for the following medication:    Tramadol    Patient states she uses the [facility] pharmacy on [address]. Please call patient at [phone] once this has been called in. Thank you.

## 2023-03-15 ENCOUNTER — NURSE ONLY (OUTPATIENT)
Dept: PRIMARY CARE CLINIC | Facility: CLINIC | Age: 73
End: 2023-03-15

## 2023-03-15 DIAGNOSIS — Z13.220 SCREENING FOR LIPID DISORDERS: ICD-10-CM

## 2023-03-15 DIAGNOSIS — E11.40 TYPE 2 DIABETES MELLITUS WITH DIABETIC NEUROPATHY, WITHOUT LONG-TERM CURRENT USE OF INSULIN (HCC): ICD-10-CM

## 2023-03-15 DIAGNOSIS — Z83.49 FAMILY HISTORY OF THYROID DISEASE: ICD-10-CM

## 2023-03-15 DIAGNOSIS — Z79.899 ENCOUNTER FOR LONG-TERM (CURRENT) USE OF MEDICATIONS: ICD-10-CM

## 2023-03-15 DIAGNOSIS — E55.9 VITAMIN D DEFICIENCY: ICD-10-CM

## 2023-03-15 DIAGNOSIS — I10 ESSENTIAL HYPERTENSION: ICD-10-CM

## 2023-03-15 LAB
25(OH)D3 SERPL-MCNC: 36.7 NG/ML (ref 30–100)
ALBUMIN SERPL-MCNC: 3.8 G/DL (ref 3.2–4.6)
ALBUMIN/GLOB SERPL: 1.4 (ref 0.4–1.6)
ALP SERPL-CCNC: 76 U/L (ref 50–136)
ALT SERPL-CCNC: 51 U/L (ref 12–65)
ANION GAP SERPL CALC-SCNC: 2 MMOL/L (ref 2–11)
AST SERPL-CCNC: 17 U/L (ref 15–37)
BASOPHILS # BLD: 0.1 K/UL (ref 0–0.2)
BASOPHILS NFR BLD: 1 % (ref 0–2)
BILIRUB SERPL-MCNC: 0.4 MG/DL (ref 0.2–1.1)
BUN SERPL-MCNC: 16 MG/DL (ref 8–23)
CALCIUM SERPL-MCNC: 9.6 MG/DL (ref 8.3–10.4)
CHLORIDE SERPL-SCNC: 106 MMOL/L (ref 101–110)
CHOLEST SERPL-MCNC: 174 MG/DL
CO2 SERPL-SCNC: 31 MMOL/L (ref 21–32)
CREAT SERPL-MCNC: 0.7 MG/DL (ref 0.6–1)
DIFFERENTIAL METHOD BLD: ABNORMAL
EOSINOPHIL # BLD: 0.1 K/UL (ref 0–0.8)
EOSINOPHIL NFR BLD: 1 % (ref 0.5–7.8)
ERYTHROCYTE [DISTWIDTH] IN BLOOD BY AUTOMATED COUNT: 11.8 % (ref 11.9–14.6)
EST. AVERAGE GLUCOSE BLD GHB EST-MCNC: 126 MG/DL
GLOBULIN SER CALC-MCNC: 2.8 G/DL (ref 2.8–4.5)
GLUCOSE SERPL-MCNC: 121 MG/DL (ref 65–100)
HBA1C MFR BLD: 6 % (ref 4.8–5.6)
HCT VFR BLD AUTO: 38.6 % (ref 35.8–46.3)
HDLC SERPL-MCNC: 60 MG/DL (ref 40–60)
HDLC SERPL: 2.9
HGB BLD-MCNC: 12.4 G/DL (ref 11.7–15.4)
IMM GRANULOCYTES # BLD AUTO: 0 K/UL (ref 0–0.5)
IMM GRANULOCYTES NFR BLD AUTO: 1 % (ref 0–5)
LDLC SERPL CALC-MCNC: 100 MG/DL
LYMPHOCYTES # BLD: 2.1 K/UL (ref 0.5–4.6)
LYMPHOCYTES NFR BLD: 28 % (ref 13–44)
MCH RBC QN AUTO: 32 PG (ref 26.1–32.9)
MCHC RBC AUTO-ENTMCNC: 32.1 G/DL (ref 31.4–35)
MCV RBC AUTO: 99.7 FL (ref 82–102)
MONOCYTES # BLD: 0.4 K/UL (ref 0.1–1.3)
MONOCYTES NFR BLD: 5 % (ref 4–12)
NEUTS SEG # BLD: 4.9 K/UL (ref 1.7–8.2)
NEUTS SEG NFR BLD: 64 % (ref 43–78)
NRBC # BLD: 0 K/UL (ref 0–0.2)
PLATELET # BLD AUTO: 367 K/UL (ref 150–450)
PMV BLD AUTO: 11.7 FL (ref 9.4–12.3)
POTASSIUM SERPL-SCNC: 4.5 MMOL/L (ref 3.5–5.1)
PROT SERPL-MCNC: 6.6 G/DL (ref 6.3–8.2)
RBC # BLD AUTO: 3.87 M/UL (ref 4.05–5.2)
SODIUM SERPL-SCNC: 139 MMOL/L (ref 133–143)
TRIGL SERPL-MCNC: 70 MG/DL (ref 35–150)
TSH, 3RD GENERATION: 0.43 UIU/ML (ref 0.36–3.74)
VLDLC SERPL CALC-MCNC: 14 MG/DL (ref 6–23)
WBC # BLD AUTO: 7.7 K/UL (ref 4.3–11.1)

## 2023-03-24 RX ORDER — TRAMADOL HYDROCHLORIDE 50 MG/1
TABLET ORAL
Qty: 60 TABLET | OUTPATIENT
Start: 2023-03-24

## 2023-04-09 RX ORDER — MELOXICAM 7.5 MG/1
TABLET ORAL
Qty: 60 TABLET | Refills: 5 | Status: SHIPPED | OUTPATIENT
Start: 2023-04-09

## 2023-04-17 DIAGNOSIS — G89.29 CHRONIC PAIN OF LEFT KNEE: ICD-10-CM

## 2023-04-17 DIAGNOSIS — M25.562 CHRONIC PAIN OF LEFT KNEE: ICD-10-CM

## 2023-04-17 NOTE — TELEPHONE ENCOUNTER
Requesting a refill of Tramadol  [facility] on [address]    Also requesting a call back about prescription.  [phone]

## 2023-04-19 RX ORDER — TRAMADOL HYDROCHLORIDE 50 MG/1
50 TABLET ORAL EVERY 6 HOURS PRN
Qty: 60 TABLET | Refills: 0 | Status: SHIPPED | OUTPATIENT
Start: 2023-04-19 | End: 2023-05-19

## 2023-05-08 RX ORDER — GABAPENTIN 300 MG/1
CAPSULE ORAL
Qty: 90 CAPSULE | Refills: 5 | Status: SHIPPED | OUTPATIENT
Start: 2023-05-08 | End: 2023-06-07

## 2023-05-08 RX ORDER — GABAPENTIN 300 MG/1
CAPSULE ORAL
Qty: 90 CAPSULE | OUTPATIENT
Start: 2023-05-08

## 2023-05-18 RX ORDER — METFORMIN HYDROCHLORIDE 500 MG/1
TABLET, EXTENDED RELEASE ORAL
Qty: 30 TABLET | Refills: 5 | OUTPATIENT
Start: 2023-05-18

## 2023-05-25 DIAGNOSIS — G89.29 CHRONIC PAIN OF LEFT KNEE: ICD-10-CM

## 2023-05-25 DIAGNOSIS — M25.562 CHRONIC PAIN OF LEFT KNEE: ICD-10-CM

## 2023-05-26 DIAGNOSIS — M25.562 CHRONIC PAIN OF LEFT KNEE: ICD-10-CM

## 2023-05-26 DIAGNOSIS — G89.29 CHRONIC PAIN OF LEFT KNEE: ICD-10-CM

## 2023-05-28 RX ORDER — TRAMADOL HYDROCHLORIDE 50 MG/1
TABLET ORAL
Qty: 60 TABLET | OUTPATIENT
Start: 2023-05-28

## 2023-05-30 RX ORDER — TRAMADOL HYDROCHLORIDE 50 MG/1
50 TABLET ORAL EVERY 6 HOURS PRN
Qty: 60 TABLET | Refills: 0 | Status: SHIPPED | OUTPATIENT
Start: 2023-05-30 | End: 2023-06-29

## 2023-07-05 DIAGNOSIS — M25.562 CHRONIC PAIN OF LEFT KNEE: ICD-10-CM

## 2023-07-05 DIAGNOSIS — G89.29 CHRONIC PAIN OF LEFT KNEE: ICD-10-CM

## 2023-07-06 ENCOUNTER — TELEPHONE (OUTPATIENT)
Dept: PRIMARY CARE CLINIC | Facility: CLINIC | Age: 73
End: 2023-07-06

## 2023-07-11 RX ORDER — CITALOPRAM 20 MG/1
TABLET ORAL
Qty: 90 TABLET | OUTPATIENT
Start: 2023-07-11

## 2023-07-11 RX ORDER — TRAMADOL HYDROCHLORIDE 50 MG/1
TABLET ORAL
Qty: 60 TABLET | OUTPATIENT
Start: 2023-07-11

## 2023-10-05 RX ORDER — MELOXICAM 7.5 MG/1
TABLET ORAL
Qty: 60 TABLET | Refills: 0 | Status: SHIPPED | OUTPATIENT
Start: 2023-10-05

## 2023-12-04 RX ORDER — AMLODIPINE BESYLATE 10 MG/1
TABLET ORAL
Qty: 90 TABLET | Refills: 3 | OUTPATIENT
Start: 2023-12-04

## 2024-01-03 RX ORDER — MELOXICAM 7.5 MG/1
TABLET ORAL
Qty: 60 TABLET | Refills: 0 | OUTPATIENT
Start: 2024-01-03

## 2024-03-03 RX ORDER — PRAVASTATIN SODIUM 40 MG
TABLET ORAL
Qty: 90 TABLET | Refills: 3 | OUTPATIENT
Start: 2024-03-03

## (undated) DEVICE — SOLUTION IV DEXTROSE/SALINE 5%-0.9% 500ML - 500ML

## (undated) DEVICE — BIPOLAR SEALER 23-112-1 AQM 6.0: Brand: AQUAMANTYS ®

## (undated) DEVICE — HANDPIECE SET WITH COAXIAL HIGH FLOW TIP AND SUCTION TUBE: Brand: INTERPULSE

## (undated) DEVICE — STRYKER PERFORMANCE SERIES SAGITTAL BLADE: Brand: STRYKER PERFORMANCE SERIES

## (undated) DEVICE — 1010 S-DRAPE TOWEL DRAPE 10/BX: Brand: STERI-DRAPE™

## (undated) DEVICE — DRAPE,U/SHT,SPLIT,FILM,60X84,STERILE: Brand: MEDLINE

## (undated) DEVICE — SUTURE STRATAFIX SPRL SZ 1 L5IN ABSRB VLT CT-1 L36MM 1/2 SXPD2B401

## (undated) DEVICE — DRAPE,HIP,W/POUCHES,STERILE: Brand: MEDLINE

## (undated) DEVICE — TRAY PREP DRY W/ PREM GLV 2 APPL 6 SPNG 2 UNDPD 1 OVERWRAP

## (undated) DEVICE — Z DISCONTINUED USE 2423037 APPLICATOR MEDICATED 3 CC CHLORHEXIDINE GLUC 2% CHLORAPREP

## (undated) DEVICE — 3M™ STERI-DRAPE™ INCISE DRAPE, XL 1051: Brand: STERI-DRAPE™

## (undated) DEVICE — SYR 10ML LUER LOK 1/5ML GRAD --

## (undated) DEVICE — SURGICAL PROCEDURE PACK TOT HIP CDS

## (undated) DEVICE — YANKAUER,BULB TIP,W/O VENT,RIGID,STERILE: Brand: MEDLINE

## (undated) DEVICE — BUTTON SWITCH PENCIL BLADE ELECTRODE, HOLSTER: Brand: EDGE

## (undated) DEVICE — AMD ANTIMICROBIAL NON-ADHERENT PAD,0.2% POLYHEXAMETHYLENE BIGUANIDE HCI (PHMB): Brand: TELFA

## (undated) DEVICE — SUTURE MCRYL SZ 2-0 L27IN ABSRB UD CP-1 1 L36MM 1/2 CIR REV Y266H

## (undated) DEVICE — REM POLYHESIVE ADULT PATIENT RETURN ELECTRODE: Brand: VALLEYLAB

## (undated) DEVICE — SUTURE STRATAFIX SZ 3-0 L30CM NONABSORBABLE UD L19MM FS-2 SXMP2B408

## (undated) DEVICE — SOLUTION IRRIG 3000ML 0.9% SOD CHL FLX CONT 0797208] ICU MEDICAL INC]

## (undated) DEVICE — CONTAINER,SPECIMEN,O.R.STRL,4.5OZ: Brand: MEDLINE

## (undated) DEVICE — SUTURE FIBERWIRE SZ 2 W/ TAPERED NEEDLE BLUE L38IN NONABSORB BLU L26.5MM 1/2 CIRCLE AR7200

## (undated) DEVICE — GOWN,AURORA,FABRIC-REINFORCED,2XL: Brand: MEDLINE

## (undated) DEVICE — 3M™ STERI-DRAPE™ INSTRUMENT POUCH 1018: Brand: STERI-DRAPE™

## (undated) DEVICE — ABDOMINAL PAD: Brand: DERMACEA

## (undated) DEVICE — STOCKINETTE TUBE 6X48 -- MEDICHOICE

## (undated) DEVICE — SYSTEM SKIN CLSR 22CM DERMBND PRINEO

## (undated) DEVICE — T4 HOOD

## (undated) DEVICE — HEWSON SUTURE RETRIEVER: Brand: HEWSON SUTURE RETRIEVER

## (undated) DEVICE — JELLY LUBRICATING 10GM PREFIL SYR LUBE

## (undated) DEVICE — SOLUTION IV 1000ML 0.9% SOD CHL

## (undated) DEVICE — 1840 FOAM BLOCK NEEDLE COUNTER: Brand: DEVON

## (undated) DEVICE — NEEDLE HYPO 21GA L1.5IN INTRAMUSCULAR S STL LATCH BVL UP

## (undated) DEVICE — (D)PREP SKN CHLRAPRP APPL 26ML -- CONVERT TO ITEM 371833